# Patient Record
Sex: FEMALE | Race: WHITE | NOT HISPANIC OR LATINO | Employment: FULL TIME | URBAN - METROPOLITAN AREA
[De-identification: names, ages, dates, MRNs, and addresses within clinical notes are randomized per-mention and may not be internally consistent; named-entity substitution may affect disease eponyms.]

---

## 2018-07-02 PROCEDURE — 93010 ELECTROCARDIOGRAM REPORT: CPT | Mod: S$GLB,,, | Performed by: INTERNAL MEDICINE

## 2018-07-02 PROCEDURE — 93005 ELECTROCARDIOGRAM TRACING: CPT | Mod: S$GLB,,, | Performed by: FAMILY MEDICINE

## 2018-07-03 ENCOUNTER — CLINICAL SUPPORT (OUTPATIENT)
Dept: CARDIOLOGY | Facility: CLINIC | Age: 43
End: 2018-07-03
Attending: INTERNAL MEDICINE
Payer: COMMERCIAL

## 2018-07-03 ENCOUNTER — OFFICE VISIT (OUTPATIENT)
Dept: CARDIOLOGY | Facility: CLINIC | Age: 43
End: 2018-07-03
Payer: COMMERCIAL

## 2018-07-03 ENCOUNTER — OFFICE VISIT (OUTPATIENT)
Dept: FAMILY MEDICINE | Facility: CLINIC | Age: 43
End: 2018-07-03
Payer: COMMERCIAL

## 2018-07-03 ENCOUNTER — LAB VISIT (OUTPATIENT)
Dept: LAB | Facility: HOSPITAL | Age: 43
End: 2018-07-03
Attending: FAMILY MEDICINE
Payer: COMMERCIAL

## 2018-07-03 VITALS
WEIGHT: 192.88 LBS | BODY MASS INDEX: 34.18 KG/M2 | DIASTOLIC BLOOD PRESSURE: 78 MMHG | SYSTOLIC BLOOD PRESSURE: 116 MMHG | HEIGHT: 63 IN | HEART RATE: 88 BPM

## 2018-07-03 VITALS
RESPIRATION RATE: 17 BRPM | OXYGEN SATURATION: 98 % | SYSTOLIC BLOOD PRESSURE: 120 MMHG | HEART RATE: 97 BPM | HEIGHT: 63 IN | WEIGHT: 192.69 LBS | TEMPERATURE: 98 F | DIASTOLIC BLOOD PRESSURE: 80 MMHG | BODY MASS INDEX: 34.14 KG/M2

## 2018-07-03 DIAGNOSIS — R94.31 ABNORMAL EKG: ICD-10-CM

## 2018-07-03 DIAGNOSIS — R53.83 FATIGUE, UNSPECIFIED TYPE: ICD-10-CM

## 2018-07-03 DIAGNOSIS — R53.83 FATIGUE, UNSPECIFIED TYPE: Primary | ICD-10-CM

## 2018-07-03 DIAGNOSIS — G93.2 PSEUDOTUMOR CEREBRI: ICD-10-CM

## 2018-07-03 DIAGNOSIS — R94.31 ABNORMAL EKG: Primary | ICD-10-CM

## 2018-07-03 DIAGNOSIS — R60.0 EDEMA OF EXTREMITIES: ICD-10-CM

## 2018-07-03 DIAGNOSIS — M79.10 MYALGIA: ICD-10-CM

## 2018-07-03 DIAGNOSIS — R07.89 CHEST PRESSURE: ICD-10-CM

## 2018-07-03 DIAGNOSIS — M25.50 ARTHRALGIA, UNSPECIFIED JOINT: ICD-10-CM

## 2018-07-03 DIAGNOSIS — F41.9 ANXIETY: ICD-10-CM

## 2018-07-03 DIAGNOSIS — E66.9 OBESITY (BMI 30-39.9): ICD-10-CM

## 2018-07-03 LAB
ALBUMIN SERPL BCP-MCNC: 4.5 G/DL
ALP SERPL-CCNC: 92 U/L
ALT SERPL W/O P-5'-P-CCNC: 17 U/L
ANION GAP SERPL CALC-SCNC: 11 MMOL/L
AST SERPL-CCNC: 14 U/L
BASOPHILS # BLD AUTO: 0.01 K/UL
BASOPHILS NFR BLD: 0.2 %
BILIRUB SERPL-MCNC: 0.3 MG/DL
BUN SERPL-MCNC: 17 MG/DL
CALCIUM SERPL-MCNC: 9.5 MG/DL
CCP AB SER IA-ACNC: <0.5 U/ML
CHLORIDE SERPL-SCNC: 108 MMOL/L
CO2 SERPL-SCNC: 20 MMOL/L
CREAT SERPL-MCNC: 1 MG/DL
CRP SERPL-MCNC: 2.8 MG/L
DIASTOLIC DYSFUNCTION: NO
DIFFERENTIAL METHOD: ABNORMAL
EOSINOPHIL # BLD AUTO: 0 K/UL
EOSINOPHIL NFR BLD: 0.5 %
ERYTHROCYTE [DISTWIDTH] IN BLOOD BY AUTOMATED COUNT: 12.6 %
ERYTHROCYTE [SEDIMENTATION RATE] IN BLOOD BY WESTERGREN METHOD: 5 MM/HR
EST. GFR  (AFRICAN AMERICAN): >60 ML/MIN/1.73 M^2
EST. GFR  (NON AFRICAN AMERICAN): >60 ML/MIN/1.73 M^2
ESTIMATED PA SYSTOLIC PRESSURE: 23.25
GLUCOSE SERPL-MCNC: 74 MG/DL
HCT VFR BLD AUTO: 45.4 %
HGB BLD-MCNC: 14.5 G/DL
IMM GRANULOCYTES # BLD AUTO: 0.01 K/UL
IMM GRANULOCYTES NFR BLD AUTO: 0.2 %
LYMPHOCYTES # BLD AUTO: 1 K/UL
LYMPHOCYTES NFR BLD: 16.1 %
MCH RBC QN AUTO: 29.6 PG
MCHC RBC AUTO-ENTMCNC: 31.9 G/DL
MCV RBC AUTO: 93 FL
MONOCYTES # BLD AUTO: 0.4 K/UL
MONOCYTES NFR BLD: 6.5 %
NEUTROPHILS # BLD AUTO: 4.6 K/UL
NEUTROPHILS NFR BLD: 76.5 %
NRBC BLD-RTO: 0 /100 WBC
PLATELET # BLD AUTO: 283 K/UL
PMV BLD AUTO: 10.8 FL
POTASSIUM SERPL-SCNC: 3.6 MMOL/L
PROT SERPL-MCNC: 7.6 G/DL
RBC # BLD AUTO: 4.9 M/UL
RETIRED EF AND QEF - SEE NOTES: 55 (ref 55–65)
RHEUMATOID FACT SERPL-ACNC: <10 IU/ML
SODIUM SERPL-SCNC: 139 MMOL/L
WBC # BLD AUTO: 6.01 K/UL

## 2018-07-03 PROCEDURE — 99999 PR PBB SHADOW E&M-NEW PATIENT-LVL IV: CPT | Mod: PBBFAC,,, | Performed by: FAMILY MEDICINE

## 2018-07-03 PROCEDURE — 85651 RBC SED RATE NONAUTOMATED: CPT | Mod: PO

## 2018-07-03 PROCEDURE — 99204 OFFICE O/P NEW MOD 45 MIN: CPT | Mod: S$GLB,,, | Performed by: INTERNAL MEDICINE

## 2018-07-03 PROCEDURE — 86200 CCP ANTIBODY: CPT

## 2018-07-03 PROCEDURE — 3008F BODY MASS INDEX DOCD: CPT | Mod: CPTII,S$GLB,, | Performed by: INTERNAL MEDICINE

## 2018-07-03 PROCEDURE — 80053 COMPREHEN METABOLIC PANEL: CPT

## 2018-07-03 PROCEDURE — 85025 COMPLETE CBC W/AUTO DIFF WBC: CPT

## 2018-07-03 PROCEDURE — 3008F BODY MASS INDEX DOCD: CPT | Mod: CPTII,S$GLB,, | Performed by: FAMILY MEDICINE

## 2018-07-03 PROCEDURE — 86038 ANTINUCLEAR ANTIBODIES: CPT

## 2018-07-03 PROCEDURE — 93306 TTE W/DOPPLER COMPLETE: CPT | Mod: S$GLB,,, | Performed by: INTERNAL MEDICINE

## 2018-07-03 PROCEDURE — 99204 OFFICE O/P NEW MOD 45 MIN: CPT | Mod: S$GLB,,, | Performed by: FAMILY MEDICINE

## 2018-07-03 PROCEDURE — 86140 C-REACTIVE PROTEIN: CPT

## 2018-07-03 PROCEDURE — 99999 PR PBB SHADOW E&M-EST. PATIENT-LVL II: CPT | Mod: PBBFAC,,, | Performed by: INTERNAL MEDICINE

## 2018-07-03 PROCEDURE — 86431 RHEUMATOID FACTOR QUANT: CPT

## 2018-07-03 PROCEDURE — 36415 COLL VENOUS BLD VENIPUNCTURE: CPT | Mod: PO

## 2018-07-03 RX ORDER — SPIRONOLACTONE 25 MG/1
50 TABLET ORAL DAILY
COMMUNITY
Start: 2018-05-14 | End: 2020-10-14 | Stop reason: DRUGHIGH

## 2018-07-03 RX ORDER — ATORVASTATIN CALCIUM 20 MG/1
20 TABLET, FILM COATED ORAL DAILY
COMMUNITY

## 2018-07-03 RX ORDER — CITALOPRAM 20 MG/1
20 TABLET, FILM COATED ORAL DAILY
COMMUNITY

## 2018-07-03 RX ORDER — ACETAZOLAMIDE 500 MG/1
500 CAPSULE, EXTENDED RELEASE ORAL 2 TIMES DAILY
COMMUNITY
Start: 2018-05-11 | End: 2020-07-30

## 2018-07-03 RX ORDER — CLONAZEPAM 0.5 MG/1
0.5 TABLET ORAL DAILY
COMMUNITY

## 2018-07-03 RX ORDER — NAPROXEN 500 MG/1
500 TABLET ORAL 2 TIMES DAILY PRN
COMMUNITY
Start: 2018-06-08 | End: 2020-07-30

## 2018-07-03 RX ORDER — FUROSEMIDE 20 MG/1
40 TABLET ORAL DAILY
COMMUNITY
Start: 2018-05-11

## 2018-07-03 NOTE — PROGRESS NOTES
Subjective:   Patient ID:  Felicity HANNA is a 43 y.o. female who presents for evaluation of No chief complaint on file.      HPI  A 44 yo female with  Pseudotumor cerebri anxiety is here for evaluation of abnormal ekg referred from DR TIMI MENA the patient is on multiple diuretics she feels some chest pressure that is  Constant for the past 2 weeks. Her last potassium on 5/15 /2018 was 3.9. She ahs no complaints of shortness of breath. The chest pressure is not affected by stress exercise body position food intake sleep . Has no associated symptoms  Does not travel anywhere. She is uncomfortable with it. She feels it is fluid related. She thinks the pressure is similar top the pain she has in her joints. No difficulty taking a deep breath. No palpitation no syncope near syncope no recent viral illness. No recent respiratory illness.her ekg has non specific changes.    Past Medical History:   Diagnosis Date    Abnormal EKG 7/3/2018    Anxiety     Chest pressure 7/3/2018    Pseudotumor cerebri     Pseudotumor cerebri 7/3/2018       Past Surgical History:   Procedure Laterality Date    APPENDECTOMY      BREAST BIOPSY      HYSTERECTOMY         Social History   Substance Use Topics    Smoking status: Never Smoker    Smokeless tobacco: Never Used    Alcohol use No       Family History   Problem Relation Age of Onset    Cancer Mother     Hypertension Sister     Hypertension Brother     Heart attack Neg Hx     Heart disease Neg Hx     Stroke Neg Hx        Current Outpatient Prescriptions   Medication Sig    acetaZOLAMIDE (DIAMOX) 500 mg CpSR Take 500 mg by mouth 2 (two) times daily.    atorvastatin (LIPITOR) 20 MG tablet Take 20 mg by mouth Daily.    citalopram (CELEXA) 20 MG tablet Take 20 mg by mouth Daily.    clonazePAM (KLONOPIN) 0.5 MG tablet Take 0.5 mg by mouth 3 (three) times daily.    furosemide (LASIX) 20 MG tablet Take 20 mg by mouth Daily.    naproxen (NAPROSYN) 500 MG tablet Take  500 mg by mouth 2 (two) times daily as needed.    omega-3s/dha/epa/fish oil/D3 (VITAMIN-D + OMEGA-3 ORAL) Take by mouth.    spironolactone (ALDACTONE) 25 MG tablet Take 25 mg by mouth Daily.     No current facility-administered medications for this visit.      Current Outpatient Prescriptions on File Prior to Visit   Medication Sig    acetaZOLAMIDE (DIAMOX) 500 mg CpSR Take 500 mg by mouth 2 (two) times daily.    atorvastatin (LIPITOR) 20 MG tablet Take 20 mg by mouth Daily.    citalopram (CELEXA) 20 MG tablet Take 20 mg by mouth Daily.    clonazePAM (KLONOPIN) 0.5 MG tablet Take 0.5 mg by mouth 3 (three) times daily.    furosemide (LASIX) 20 MG tablet Take 20 mg by mouth Daily.    naproxen (NAPROSYN) 500 MG tablet Take 500 mg by mouth 2 (two) times daily as needed.    omega-3s/dha/epa/fish oil/D3 (VITAMIN-D + OMEGA-3 ORAL) Take by mouth.    spironolactone (ALDACTONE) 25 MG tablet Take 25 mg by mouth Daily.     No current facility-administered medications on file prior to visit.        Review of patient's allergies indicates:  No Known Allergies    Review of Systems   Constitution: Negative for diaphoresis, weakness, malaise/fatigue and weight gain.   HENT: Negative for hoarse voice.    Eyes: Negative for double vision and visual disturbance.   Cardiovascular: Negative for chest pain, claudication, cyanosis, dyspnea on exertion, irregular heartbeat, leg swelling, near-syncope, orthopnea, palpitations, paroxysmal nocturnal dyspnea and syncope.        Chest pressure    Respiratory: Negative for cough, hemoptysis, shortness of breath and snoring.    Hematologic/Lymphatic: Negative for bleeding problem. Does not bruise/bleed easily.   Skin: Negative for color change and poor wound healing.   Musculoskeletal: Positive for joint pain. Negative for muscle cramps, muscle weakness and myalgias.   Gastrointestinal: Negative for bloating, abdominal pain, change in bowel habit, diarrhea, heartburn, hematemesis,  "hematochezia, melena and nausea.   Neurological: Negative for excessive daytime sleepiness, dizziness, headaches, light-headedness, loss of balance and numbness.   Psychiatric/Behavioral: Negative for memory loss. The patient does not have insomnia.    Allergic/Immunologic: Negative for hives.       Objective:   Physical Exam   Constitutional: She is oriented to person, place, and time. She appears well-developed and well-nourished. She does not appear ill. No distress.   HENT:   Head: Normocephalic and atraumatic.   Eyes: EOM are normal. Pupils are equal, round, and reactive to light. No scleral icterus.   Neck: Normal range of motion. Neck supple. Normal carotid pulses, no hepatojugular reflux and no JVD present. Carotid bruit is not present. No tracheal deviation present. No thyromegaly present.   Cardiovascular: Normal rate, regular rhythm, normal heart sounds and normal pulses.  Exam reveals no gallop and no friction rub.    No murmur heard.  Pulmonary/Chest: Effort normal and breath sounds normal. No respiratory distress. She has no wheezes. She has no rhonchi. She has no rales. She exhibits no tenderness.   Abdominal: Soft. Normal appearance, normal aorta and bowel sounds are normal. She exhibits no abdominal bruit, no ascites and no pulsatile midline mass. There is no hepatomegaly. There is no tenderness.   Musculoskeletal: She exhibits no edema.        Right shoulder: She exhibits no deformity.   Neurological: She is alert and oriented to person, place, and time. She has normal strength. No cranial nerve deficit. Coordination normal.   Skin: Skin is warm and dry. No rash noted. No cyanosis or erythema. Nails show no clubbing.   Psychiatric: She has a normal mood and affect. Her speech is normal and behavior is normal.     Vitals:    07/03/18 1143   BP: 116/78   Pulse: 88   Weight: 87.5 kg (192 lb 14.4 oz)   Height: 5' 3" (1.6 m)     No results found for: CHOL  No results found for: HDL  No results found " for: LDLCALC  No results found for: TRIG  No results found for: CHOLHDL    Chemistry    No results found for: NA, K, CL, CO2, BUN, CREATININE, GLU No results found for: CALCIUM, ALKPHOS, AST, ALT, BILITOT, ESTGFRAFRICA, EGFRNONAA       No results found for: TSH  No results found for: INR, PROTIME  No results found for: WBC, HGB, HCT, MCV, PLT  BNP  @LABRCNTIP(BNP,BNPTRIAGEBLO)@  CrCl cannot be calculated (No order found.).  Assessment:     1. Abnormal EKG    2. Pseudotumor cerebri    3. Chest pressure      Atypical symptoms with generalized mild tenderness in joints w/o any chest wall tenderness or ischemic changes. She ahs non specific ekg changes that could be related to diuretics intake. Will obtain echo make sure no pericardial effusion   Plan:   Echo   Phone review.

## 2018-07-03 NOTE — LETTER
July 3, 2018      Lucia Espinoza MD  [address] [address] [address]           [facility] - Cardiology  [address] [facility]  [place] [address]  Phone: [phone]  Fax: [phone]          Patient: Felicity HANNA   MR Number: [id number]   YOB: 1975   Date of Visit: 7/3/2018       Dear Dr. Lucia Espinoza:    Thank you for referring Felicity HANNA to me for evaluation. Attached you will find relevant portions of my assessment and plan of care.    If you have questions, please do not hesitate to call me. I look forward to following Felicity HANNA along with you.    Sincerely,    Sanam Alvarado MD    Enclosure  CC:  No Recipients    If you would like to receive this communication electronically, please contact [email or web address] or [phone] to request more information on [employer] Link access.    For providers and/or their staff who would like to refer a patient to [facility], please contact us through our one-stop-shop provider referral line, [facility], at [phone].    If you feel you have received this communication in error or would no longer like to receive these types of communications, please e-mail [email or web address]

## 2018-07-03 NOTE — PROGRESS NOTES
"Subjective:       Patient ID: Feliciyt HANNA is a 43 y.o. female.    Chief Complaint: Fatigue    Fatigue   This is a new problem. Episode onset: 3 weeks. The problem occurs constantly. The problem has been unchanged. Associated symptoms include arthralgias, fatigue, headaches, myalgias and a visual change. Pertinent negatives include no abdominal pain, change in bowel habit, chest pain, congestion, coughing, nausea, rash, urinary symptoms, vertigo or weakness. Nothing aggravates the symptoms. Treatments tried: on Spironolactone, Diamox and Lasix. The treatment provided no relief.   Patient reports she is followed by Neurology and NSG after diagnosis of pseudotumor cerebri in February 2018. States she had an eye exam at [facility] and was sent to Ophthalmology (Dr. Mosqueda). She was then referred to Neurology (Dr. Medina) who ordered MRI and then referred on to NSG (Dr. Stone). She reports that she has also been evaluated by ENT due to pulsatile tinnitus (Dr. Pineda) with no significant findings per patient on exam. She reports persistent issues with edema. States she awakens daily with pain and swelling to the lower extremities- unable to ambulate initially. She states she feels is swollen "around the heart". She has never seen Cardiology. There is no report of CP. She describes pressure sensation along the left chest when she is "moving a lot at work". Denies any SOB or SETHI. She is taking Celexa and Klonopin (BID) for anxiety relief- stable symptom control is reported.     Past Medical History:   Diagnosis Date    Anxiety     Pseudotumor cerebri      Past Surgical History:   Procedure Laterality Date    APPENDECTOMY      BREAST BIOPSY      HYSTERECTOMY       Family History   Problem Relation Age of Onset    Cancer Mother      Review of Systems   Constitutional: Positive for fatigue. Negative for activity change, appetite change and unexpected weight change.   HENT: Positive for tinnitus. Negative " "for congestion, ear pain and sinus pressure.    Respiratory: Negative for cough and shortness of breath.    Cardiovascular: Positive for leg swelling. Negative for chest pain and palpitations.   Gastrointestinal: Negative for abdominal pain, change in bowel habit, constipation, diarrhea and nausea.   Endocrine: Negative for polydipsia and polyuria.   Genitourinary: Positive for decreased urine volume. Negative for difficulty urinating and frequency.   Musculoskeletal: Positive for arthralgias and myalgias.   Skin: Negative for color change and rash.   Neurological: Positive for headaches. Negative for dizziness, vertigo and weakness.   Psychiatric/Behavioral: Negative for dysphoric mood and sleep disturbance. The patient is nervous/anxious.        Objective:   /80   Pulse 97   Temp 97.5 °F (36.4 °C) (Temporal)   Resp 17   Ht 5' 3" (1.6 m)   Wt 87.4 kg (192 lb 10.9 oz)   SpO2 98%   BMI 34.13 kg/m²   Physical Exam   Constitutional: She is oriented to person, place, and time. She appears well-developed and well-nourished. No distress.   Obese, non-toxic   HENT:   Head: Normocephalic and atraumatic.   Right Ear: Tympanic membrane, external ear and ear canal normal.   Left Ear: Tympanic membrane, external ear and ear canal normal.   Nose: Nose normal.   Mouth/Throat: Oropharynx is clear and moist.   Eyes: Conjunctivae and EOM are normal. Pupils are equal, round, and reactive to light.   Neck: Normal range of motion. Neck supple. No thyromegaly present.   Cardiovascular: Normal rate, regular rhythm and normal heart sounds.    Pulmonary/Chest: Effort normal and breath sounds normal.   Abdominal: Soft. Bowel sounds are normal. She exhibits no distension. There is no tenderness.   Musculoskeletal: She exhibits no edema.   Neurological: She is alert and oriented to person, place, and time.   Skin: Skin is warm and dry. She is not diaphoretic.   Psychiatric: She has a normal mood and affect. Her behavior is " normal.       Assessment:       1. Fatigue, unspecified type    2. Arthralgia, unspecified joint    3. Myalgia    4. Edema of extremities    5. Pseudotumor cerebri    6. Abnormal EKG    7. Anxiety    8. Obesity (BMI 30-39.9)        Plan:      Fatigue, unspecified type  Reviewed notes brought by the patient today from [facility]. Recommend consideration for a sleep study to evaluate for possible PETER. States this has already been advised by her Neurologist. She has undergone workup to include assessment of cortisol, ACTH, thyroid function prolactin and growth hormone. She may wish to consider consultation with Endocrinology- she will see the PA for Dr. Medina next week and I have encouraged this conversation. She is very concerned about the diagnosis of empty sella syndrome and has not appreciated benefit from her current treatment plan. Discussed that DDX for fatigue is broad and likely multifactorial- she acknowledges understanding.   -     CBC auto differential; Future; Expected date: 07/03/2018  -     Comprehensive metabolic panel; Future; Expected date: 07/03/2018    Arthralgia, unspecified joint  No previous workup for her joint and muscle related pains. Proceed with labs as below. Patient may need further evaluation per Rheumatology pending results.  -     Sedimentation rate; Future; Expected date: 07/03/2018  -     C-reactive protein; Future; Expected date: 07/03/2018  -     DELORES; Future; Expected date: 07/03/2018  -     Rheumatoid factor; Future; Expected date: 07/03/2018  -     CYCLIC CITRUL PEPTIDE ANTIBODY, IGG; Future; Expected date: 07/03/2018    Myalgia  As above.  -     Sedimentation rate; Future; Expected date: 07/03/2018  -     C-reactive protein; Future; Expected date: 07/03/2018  -     DELORES; Future; Expected date: 07/03/2018  -     Rheumatoid factor; Future; Expected date: 07/03/2018  -     CYCLIC CITRUL PEPTIDE ANTIBODY, IGG; Future; Expected date: 07/03/2018    Edema of extremities  -      IN OFFICE EKG 12-LEAD (to Muse)    Pseudotumor cerebri  Continue with current treatment and follow-up recommendations as per Neurology.    Abnormal EKG  See Cardiology for further evaluation.    Anxiety  Continue with current treatment and follow-up recommendations as per PCP Dr. Randal Glover.    Obesity (BMI 30-39.9)  Weight loss efforts are encouraged.

## 2018-07-05 ENCOUNTER — TELEPHONE (OUTPATIENT)
Dept: CARDIOLOGY | Facility: CLINIC | Age: 43
End: 2018-07-05

## 2018-07-05 LAB — ANA SER QL IF: NORMAL

## 2018-07-05 NOTE — TELEPHONE ENCOUNTER
----- Message from Sanam Alvarado MD sent at 7/4/2018  9:37 AM CDT -----  Normal heart function no fluid around the heart

## 2018-07-05 NOTE — TELEPHONE ENCOUNTER
----- Message from Sanam Alvardao MD sent at 7/4/2018  9:37 AM CDT -----  Normal heart function no fluid around the heart

## 2018-08-23 ENCOUNTER — PATIENT MESSAGE (OUTPATIENT)
Dept: FAMILY MEDICINE | Facility: CLINIC | Age: 43
End: 2018-08-23

## 2019-12-19 ENCOUNTER — PATIENT MESSAGE (OUTPATIENT)
Dept: ADMINISTRATIVE | Facility: OTHER | Age: 44
End: 2019-12-19

## 2019-12-20 DIAGNOSIS — Z13.79 GENETIC SCREENING: ICD-10-CM

## 2020-07-28 ENCOUNTER — TELEPHONE (OUTPATIENT)
Dept: BARIATRICS | Facility: CLINIC | Age: 45
End: 2020-07-28

## 2020-07-28 NOTE — TELEPHONE ENCOUNTER
Phoned patient to discuss work up. Patient reports she had her sleep study done Monday and has another appointment on Thursday. Will request records. Patient reports she had a UTI last week and was treated with bactrim. Will route records to Dr. Rodriguez to review.

## 2020-07-29 ENCOUNTER — PATIENT MESSAGE (OUTPATIENT)
Dept: BARIATRICS | Facility: CLINIC | Age: 45
End: 2020-07-29

## 2020-07-29 DIAGNOSIS — Z01.818 PRE-OP EVALUATION: Primary | ICD-10-CM

## 2020-07-30 ENCOUNTER — OFFICE VISIT (OUTPATIENT)
Dept: ENDOCRINOLOGY | Facility: CLINIC | Age: 45
End: 2020-07-30
Payer: COMMERCIAL

## 2020-07-30 VITALS
WEIGHT: 211.44 LBS | BODY MASS INDEX: 37.46 KG/M2 | HEIGHT: 63 IN | DIASTOLIC BLOOD PRESSURE: 94 MMHG | SYSTOLIC BLOOD PRESSURE: 124 MMHG | HEART RATE: 99 BPM

## 2020-07-30 DIAGNOSIS — Z01.818 PRE-OP EVALUATION: ICD-10-CM

## 2020-07-30 DIAGNOSIS — G93.2 PSEUDOTUMOR CEREBRI: ICD-10-CM

## 2020-07-30 DIAGNOSIS — E23.6 EMPTY SELLA: Primary | ICD-10-CM

## 2020-07-30 PROCEDURE — 99204 PR OFFICE/OUTPT VISIT, NEW, LEVL IV, 45-59 MIN: ICD-10-PCS | Mod: S$GLB,,, | Performed by: INTERNAL MEDICINE

## 2020-07-30 PROCEDURE — 99999 PR PBB SHADOW E&M-EST. PATIENT-LVL IV: ICD-10-PCS | Mod: PBBFAC,,, | Performed by: INTERNAL MEDICINE

## 2020-07-30 PROCEDURE — 3008F PR BODY MASS INDEX (BMI) DOCUMENTED: ICD-10-PCS | Mod: CPTII,S$GLB,, | Performed by: INTERNAL MEDICINE

## 2020-07-30 PROCEDURE — 99999 PR PBB SHADOW E&M-EST. PATIENT-LVL IV: CPT | Mod: PBBFAC,,, | Performed by: INTERNAL MEDICINE

## 2020-07-30 PROCEDURE — 3008F BODY MASS INDEX DOCD: CPT | Mod: CPTII,S$GLB,, | Performed by: INTERNAL MEDICINE

## 2020-07-30 PROCEDURE — 99204 OFFICE O/P NEW MOD 45 MIN: CPT | Mod: S$GLB,,, | Performed by: INTERNAL MEDICINE

## 2020-07-30 RX ORDER — MULTIVITAMIN
1 TABLET ORAL DAILY
COMMUNITY
End: 2020-10-14 | Stop reason: CLARIF

## 2020-07-30 NOTE — LETTER
July 30, 2020      Hay Cheung Jr., MD  [address]  [facility] [address]           [facility] [address]  [address]  [facility] [address]  Phone: [phone]  Fax: [phone]          Patient: Felicity HANNA   MR Number: [id number]   YOB: 1975   Date of Visit: 7/30/2020       Dear Dr. Hay Cheung Jr.:    Thank you for referring Felicity HANNA to me for evaluation. Attached you will find relevant portions of my assessment and plan of care.    If you have questions, please do not hesitate to call me. I look forward to following Felicity HANNA along with you.    Sincerely,    Ermelinda Fernandes MD    Enclosure  CC:  No Recipients    If you would like to receive this communication electronically, please contact [email or web address] or [phone] to request more information on [employer] Link access.    For providers and/or their staff who would like to refer a patient to [facility], please contact us through our one-stop-shop provider referral line, [facility], at [phone].    If you feel you have received this communication in error or would no longer like to receive these types of communications, please e-mail [email or web address]

## 2020-07-30 NOTE — PROGRESS NOTES
Subjective:      Patient ID: Felicity HANNA is a 45 y.o. female.    Chief Complaint:  Pseudotumor Cerebri      History of Present Illness  She is a pharmacy tech    Here due concerns about imaging finding of an empty sella in 2018  I do not see the official report but it was noted in her documentation    She was diagnosis with pseudotumor cerebri in February 2018  States she had an eye exam at [facility] and was sent to Ophthalmology (Dr. Mosqueda)- papilledema. She was then referred to Neurology (Dr. Medina) who ordered MRI and then referred on to NSG (Dr. Stone)  Treatments tried: on Spironolactone, Diamox and Lasix. The treatment provided no relief.          Per care everywhere note an empty sella confirmed on MRI  This prompted labs:    in 2018 she had a normal ACTH   8:00 a.m. cortisol of 7.8   Prolactin was 13    To help address her ongoing concerns she is being evaluated for bariatric surgery and/ or  shunt   She is also undergoing an PETER evaluation - has an appt today for the results     Current concerns are:  Headaches  Worsening papilledema  +fatigue     Complains of inability to lose weight   No nausea vomiting or anorexia  Denies palpitations  Denies constipation or diarrhea     Partial hysterectomy   +hot all time  +vaginal dryness     No fractures       She does not have a diagnosis of hypothyroidism.... but she was on nature Thyroid 32.5 mg a day in the past - after seeing a doctor for low energy - she got pellets - early 40s   She is currently off all thyroid supplements    With regards to obesity, Body mass index is 37.45 kg/m².    Denies symptoms of hyperglycemia such as polyuria, polydipsia, nocturia, unexplained weight loss or blurred vision      She has been diagnosed with major depressive disorder as well as general anxiety    Review of Systems   Constitutional: Negative for unexpected weight change.   Eyes: Positive for visual disturbance.   Respiratory: Negative for shortness of breath.  "   Cardiovascular: Negative for chest pain.   Gastrointestinal: Negative for abdominal pain.   Musculoskeletal: Positive for back pain.   Skin: Negative for wound.   Neurological: Positive for headaches.   Hematological: Does not bruise/bleed easily.   Psychiatric/Behavioral: Positive for sleep disturbance.       Objective:     BP (!) 124/94 (BP Location: Left arm, Patient Position: Sitting)   Pulse 99   Ht 5' 3" (1.6 m)   Wt 95.9 kg (211 lb 6.7 oz)   BMI 37.45 kg/m²   BP Readings from Last 3 Encounters:   07/30/20 (!) 124/94   07/03/18 116/78   07/03/18 120/80     Wt Readings from Last 1 Encounters:   07/30/20 0911 95.9 kg (211 lb 6.7 oz)     Body mass index is 37.45 kg/m².      Physical Exam  Vitals signs reviewed.   Constitutional:       Appearance: She is well-developed.   HENT:      Right Ear: External ear normal.      Left Ear: External ear normal.      Nose: Nose normal.   Neck:      Thyroid: No thyromegaly.      Trachea: No tracheal deviation.   Cardiovascular:      Rate and Rhythm: Normal rate.      Heart sounds: No murmur.   Pulmonary:      Effort: Pulmonary effort is normal.      Breath sounds: Normal breath sounds.   Abdominal:      Palpations: Abdomen is soft.      Tenderness: There is no abdominal tenderness.      Hernia: No hernia is present.   Skin:     Findings: No rash.      Comments:        Neurological:      Cranial Nerves: No cranial nerve deficit.      Deep Tendon Reflexes: Reflexes normal.   Psychiatric:         Judgment: Judgment normal.       +  skin tags  No supraclavicular fulness  Pale thin striae  Normal proximal muscle strength        Lab Reviewed:  Care everywhere    Assessment and Plan     Empty sella  empty sella - usually just a radiology finding   generally not associated with anterior pituitary hormonal deficiency - check basic labs    Reassurance provided        Pseudotumor cerebri  Once we get results of hormonal studies no contraindications from an endocrine standpoint to " proceed with any procedures needed     Fatigue-- suspect due to sleep apnea.  She will get her results today  No signs or symptoms to suggest adrenal insufficiency

## 2020-07-30 NOTE — ASSESSMENT & PLAN NOTE
empty sella - usually just a radiology finding   generally not associated with anterior pituitary hormonal deficiency - check basic labs    Reassurance provided

## 2020-07-30 NOTE — ASSESSMENT & PLAN NOTE
Once we get results of hormonal studies no contraindications from an endocrine standpoint to proceed with any procedures needed

## 2020-07-31 ENCOUNTER — LAB VISIT (OUTPATIENT)
Dept: LAB | Facility: HOSPITAL | Age: 45
End: 2020-07-31
Attending: INTERNAL MEDICINE
Payer: COMMERCIAL

## 2020-07-31 DIAGNOSIS — E23.6 EMPTY SELLA: ICD-10-CM

## 2020-07-31 LAB
ALBUMIN SERPL BCP-MCNC: 4.3 G/DL (ref 3.5–5.2)
ALP SERPL-CCNC: 88 U/L (ref 55–135)
ALT SERPL W/O P-5'-P-CCNC: 24 U/L (ref 10–44)
ANION GAP SERPL CALC-SCNC: 10 MMOL/L (ref 8–16)
AST SERPL-CCNC: 19 U/L (ref 10–40)
BILIRUB SERPL-MCNC: 0.3 MG/DL (ref 0.1–1)
BUN SERPL-MCNC: 20 MG/DL (ref 6–20)
CALCIUM SERPL-MCNC: 9.5 MG/DL (ref 8.7–10.5)
CHLORIDE SERPL-SCNC: 106 MMOL/L (ref 95–110)
CO2 SERPL-SCNC: 25 MMOL/L (ref 23–29)
CORTIS SERPL-MCNC: 12 UG/DL (ref 4.3–22.4)
CREAT SERPL-MCNC: 1 MG/DL (ref 0.5–1.4)
EST. GFR  (AFRICAN AMERICAN): >60 ML/MIN/1.73 M^2
EST. GFR  (NON AFRICAN AMERICAN): >60 ML/MIN/1.73 M^2
ESTRADIOL SERPL-MCNC: 15 PG/ML
FSH SERPL-ACNC: 96.6 MIU/ML
GLUCOSE SERPL-MCNC: 98 MG/DL (ref 70–110)
POTASSIUM SERPL-SCNC: 3.9 MMOL/L (ref 3.5–5.1)
PROT SERPL-MCNC: 7.5 G/DL (ref 6–8.4)
SODIUM SERPL-SCNC: 141 MMOL/L (ref 136–145)

## 2020-07-31 PROCEDURE — 80053 COMPREHEN METABOLIC PANEL: CPT

## 2020-07-31 PROCEDURE — 82024 ASSAY OF ACTH: CPT

## 2020-07-31 PROCEDURE — 83001 ASSAY OF GONADOTROPIN (FSH): CPT

## 2020-07-31 PROCEDURE — 82533 TOTAL CORTISOL: CPT

## 2020-07-31 PROCEDURE — 84305 ASSAY OF SOMATOMEDIN: CPT

## 2020-07-31 PROCEDURE — 82670 ASSAY OF TOTAL ESTRADIOL: CPT

## 2020-08-03 ENCOUNTER — PATIENT MESSAGE (OUTPATIENT)
Dept: BARIATRICS | Facility: CLINIC | Age: 45
End: 2020-08-03

## 2020-08-03 ENCOUNTER — DOCUMENTATION ONLY (OUTPATIENT)
Dept: BARIATRICS | Facility: CLINIC | Age: 45
End: 2020-08-03

## 2020-08-03 DIAGNOSIS — E66.9 OBESITY, UNSPECIFIED CLASSIFICATION, UNSPECIFIED OBESITY TYPE, UNSPECIFIED WHETHER SERIOUS COMORBIDITY PRESENT: ICD-10-CM

## 2020-08-03 DIAGNOSIS — Z01.818 PRE-OP EVALUATION: ICD-10-CM

## 2020-08-03 NOTE — PROGRESS NOTES
Bariatric Surgery Online Course Form Submission  Someone has submitted a Bariatric Surgery Online Course Form Submission on this page.  Date: 07- 20:27:31  Patient's Name: Felicity Eisenberg  YOB: 1975 Email: [email or web address]  Phone: [phone]   Patient Address: [address] [place], [place] [address]  Preferred Surgical Location: [facility]   I certify that I am 18 years of age or older: Yes   Confirmation Code: [id number]  Verification of Bariatric Seminar: yes  Insurance Information  Insurance or Self Pay? Insurance - Fill out fields below  Insurance Company Name: Blue cross blue shield   Type of Coverage/Coverage Plan (i.e. PPO, HMO): PPO   Group Name: [employer]   Subscriber Name: Felicity Eisenberg   Member Name (patient's name): Felicity Eisenberg   Member ID/Policy #:[id number]   Plan Effective Date:   Card Issuance date:

## 2020-08-04 LAB — ACTH PLAS-MCNC: 12 PG/ML (ref 0–46)

## 2020-08-05 ENCOUNTER — LAB VISIT (OUTPATIENT)
Dept: LAB | Facility: HOSPITAL | Age: 45
End: 2020-08-05
Attending: SURGERY
Payer: COMMERCIAL

## 2020-08-05 DIAGNOSIS — Z01.818 PRE-OP EVALUATION: ICD-10-CM

## 2020-08-05 DIAGNOSIS — E66.9 OBESITY, UNSPECIFIED CLASSIFICATION, UNSPECIFIED OBESITY TYPE, UNSPECIFIED WHETHER SERIOUS COMORBIDITY PRESENT: ICD-10-CM

## 2020-08-05 LAB
IGF-I SERPL-MCNC: 138 NG/ML (ref 44–227)
IGF-I Z-SCORE SERPL: 0.67 SD

## 2020-08-05 PROCEDURE — 84425 ASSAY OF VITAMIN B-1: CPT

## 2020-08-05 PROCEDURE — 84100 ASSAY OF PHOSPHORUS: CPT

## 2020-08-05 PROCEDURE — 84439 ASSAY OF FREE THYROXINE: CPT

## 2020-08-05 PROCEDURE — 36415 COLL VENOUS BLD VENIPUNCTURE: CPT | Mod: PO

## 2020-08-05 PROCEDURE — 83735 ASSAY OF MAGNESIUM: CPT

## 2020-08-05 PROCEDURE — 82607 VITAMIN B-12: CPT

## 2020-08-05 PROCEDURE — 84480 ASSAY TRIIODOTHYRONINE (T3): CPT

## 2020-08-05 PROCEDURE — 84436 ASSAY OF TOTAL THYROXINE: CPT

## 2020-08-06 LAB
MAGNESIUM SERPL-MCNC: 2.3 MG/DL (ref 1.6–2.6)
PHOSPHATE SERPL-MCNC: 4.3 MG/DL (ref 2.7–4.5)
T3 SERPL-MCNC: 93 NG/DL (ref 60–180)
T4 FREE SERPL-MCNC: 1.01 NG/DL (ref 0.71–1.51)
T4 SERPL-MCNC: 7.3 UG/DL (ref 4.5–11.5)
VIT B12 SERPL-MCNC: 362 PG/ML (ref 210–950)

## 2020-08-10 DIAGNOSIS — Z01.818 PRE-OP EVALUATION: Primary | ICD-10-CM

## 2020-08-11 ENCOUNTER — PATIENT MESSAGE (OUTPATIENT)
Dept: BARIATRICS | Facility: CLINIC | Age: 45
End: 2020-08-11

## 2020-08-12 LAB — VIT B1 BLD-MCNC: 59 UG/L (ref 38–122)

## 2020-08-17 ENCOUNTER — PATIENT MESSAGE (OUTPATIENT)
Dept: BARIATRICS | Facility: CLINIC | Age: 45
End: 2020-08-17

## 2020-08-17 DIAGNOSIS — E66.9 OBESITY, UNSPECIFIED CLASSIFICATION, UNSPECIFIED OBESITY TYPE, UNSPECIFIED WHETHER SERIOUS COMORBIDITY PRESENT: ICD-10-CM

## 2020-08-17 DIAGNOSIS — Z01.818 PRE-OP EVALUATION: ICD-10-CM
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
## 2020-08-26 ENCOUNTER — INITIAL CONSULT (OUTPATIENT)
Dept: BARIATRICS | Facility: CLINIC | Age: 45
End: 2020-08-26
Payer: COMMERCIAL

## 2020-08-26 ENCOUNTER — CLINICAL SUPPORT (OUTPATIENT)
Dept: BARIATRICS | Facility: CLINIC | Age: 45
End: 2020-08-26
Payer: COMMERCIAL

## 2020-08-26 ENCOUNTER — OFFICE VISIT (OUTPATIENT)
Dept: PSYCHIATRY | Facility: CLINIC | Age: 45
End: 2020-08-26
Payer: COMMERCIAL

## 2020-08-26 ENCOUNTER — INITIAL CONSULT (OUTPATIENT)
Dept: INTERNAL MEDICINE | Facility: CLINIC | Age: 45
End: 2020-08-26
Payer: COMMERCIAL

## 2020-08-26 ENCOUNTER — LAB VISIT (OUTPATIENT)
Dept: LAB | Facility: HOSPITAL | Age: 45
End: 2020-08-26
Attending: SURGERY
Payer: COMMERCIAL

## 2020-08-26 VITALS — BODY MASS INDEX: 37.3 KG/M2 | HEIGHT: 63 IN | WEIGHT: 210.5 LBS

## 2020-08-26 VITALS
HEIGHT: 63 IN | WEIGHT: 210.56 LBS | DIASTOLIC BLOOD PRESSURE: 68 MMHG | SYSTOLIC BLOOD PRESSURE: 142 MMHG | HEART RATE: 79 BPM | BODY MASS INDEX: 37.31 KG/M2

## 2020-08-26 VITALS — HEIGHT: 63 IN | BODY MASS INDEX: 37.31 KG/M2 | WEIGHT: 210.56 LBS

## 2020-08-26 DIAGNOSIS — I10 ESSENTIAL HYPERTENSION: ICD-10-CM

## 2020-08-26 DIAGNOSIS — E66.01 CLASS 2 SEVERE OBESITY DUE TO EXCESS CALORIES WITH SERIOUS COMORBIDITY AND BODY MASS INDEX (BMI) OF 37.0 TO 37.9 IN ADULT: Primary | ICD-10-CM

## 2020-08-26 DIAGNOSIS — G93.2 PSEUDOTUMOR CEREBRI: ICD-10-CM

## 2020-08-26 DIAGNOSIS — Z01.818 PREOP EXAMINATION: Primary | ICD-10-CM

## 2020-08-26 DIAGNOSIS — Z01.818 PRE-OP EVALUATION: ICD-10-CM

## 2020-08-26 DIAGNOSIS — N20.0 KIDNEY STONES: ICD-10-CM

## 2020-08-26 DIAGNOSIS — R60.9 EDEMA, UNSPECIFIED TYPE: ICD-10-CM

## 2020-08-26 DIAGNOSIS — F41.9 ANXIETY: ICD-10-CM

## 2020-08-26 DIAGNOSIS — E78.5 HYPERLIPIDEMIA, UNSPECIFIED HYPERLIPIDEMIA TYPE: ICD-10-CM

## 2020-08-26 DIAGNOSIS — G47.33 OSA (OBSTRUCTIVE SLEEP APNEA): ICD-10-CM

## 2020-08-26 DIAGNOSIS — E23.6 EMPTY SELLA: ICD-10-CM

## 2020-08-26 DIAGNOSIS — E66.9 OBESITY, UNSPECIFIED CLASSIFICATION, UNSPECIFIED OBESITY TYPE, UNSPECIFIED WHETHER SERIOUS COMORBIDITY PRESENT: ICD-10-CM

## 2020-08-26 DIAGNOSIS — Z01.818 PREOPERATIVE EVALUATION TO RULE OUT SURGICAL CONTRAINDICATION: Primary | ICD-10-CM

## 2020-08-26 DIAGNOSIS — Z87.01 HISTORY OF PNEUMONIA: ICD-10-CM

## 2020-08-26 DIAGNOSIS — K21.9 GASTROESOPHAGEAL REFLUX DISEASE, ESOPHAGITIS PRESENCE NOT SPECIFIED: ICD-10-CM

## 2020-08-26 DIAGNOSIS — R94.31 ABNORMAL EKG: ICD-10-CM

## 2020-08-26 DIAGNOSIS — Z90.710 H/O VAGINAL HYSTERECTOMY: ICD-10-CM

## 2020-08-26 PROBLEM — R07.89 CHEST PRESSURE: Status: RESOLVED | Noted: 2018-07-03 | Resolved: 2020-08-26

## 2020-08-26 LAB
25(OH)D3+25(OH)D2 SERPL-MCNC: 29 NG/ML (ref 30–96)
ANION GAP SERPL CALC-SCNC: 7 MMOL/L (ref 8–16)
BASOPHILS # BLD AUTO: 0.02 K/UL (ref 0–0.2)
BASOPHILS NFR BLD: 0.4 % (ref 0–1.9)
BUN SERPL-MCNC: 15 MG/DL (ref 6–20)
CALCIUM SERPL-MCNC: 9.4 MG/DL (ref 8.7–10.5)
CHLORIDE SERPL-SCNC: 107 MMOL/L (ref 95–110)
CHOLEST SERPL-MCNC: 162 MG/DL (ref 120–199)
CHOLEST/HDLC SERPL: 3.6 {RATIO} (ref 2–5)
CO2 SERPL-SCNC: 28 MMOL/L (ref 23–29)
CREAT SERPL-MCNC: 0.9 MG/DL (ref 0.5–1.4)
DIFFERENTIAL METHOD: NORMAL
EOSINOPHIL # BLD AUTO: 0.1 K/UL (ref 0–0.5)
EOSINOPHIL NFR BLD: 1.6 % (ref 0–8)
ERYTHROCYTE [DISTWIDTH] IN BLOOD BY AUTOMATED COUNT: 12.2 % (ref 11.5–14.5)
EST. GFR  (AFRICAN AMERICAN): >60 ML/MIN/1.73 M^2
EST. GFR  (NON AFRICAN AMERICAN): >60 ML/MIN/1.73 M^2
ESTIMATED AVG GLUCOSE: 117 MG/DL (ref 68–131)
FOLATE SERPL-MCNC: 16.6 NG/ML (ref 4–24)
GLUCOSE SERPL-MCNC: 94 MG/DL (ref 70–110)
HBA1C MFR BLD HPLC: 5.7 % (ref 4–5.6)
HCT VFR BLD AUTO: 43.5 % (ref 37–48.5)
HDLC SERPL-MCNC: 45 MG/DL (ref 40–75)
HDLC SERPL: 27.8 % (ref 20–50)
HGB BLD-MCNC: 13.9 G/DL (ref 12–16)
IMM GRANULOCYTES # BLD AUTO: 0.01 K/UL (ref 0–0.04)
IMM GRANULOCYTES NFR BLD AUTO: 0.2 % (ref 0–0.5)
IRON SERPL-MCNC: 100 UG/DL (ref 30–160)
LDLC SERPL CALC-MCNC: 93.6 MG/DL (ref 63–159)
LYMPHOCYTES # BLD AUTO: 1.4 K/UL (ref 1–4.8)
LYMPHOCYTES NFR BLD: 28.9 % (ref 18–48)
MCH RBC QN AUTO: 28.8 PG (ref 27–31)
MCHC RBC AUTO-ENTMCNC: 32 G/DL (ref 32–36)
MCV RBC AUTO: 90 FL (ref 82–98)
MONOCYTES # BLD AUTO: 0.4 K/UL (ref 0.3–1)
MONOCYTES NFR BLD: 7.1 % (ref 4–15)
NEUTROPHILS # BLD AUTO: 3.1 K/UL (ref 1.8–7.7)
NEUTROPHILS NFR BLD: 61.8 % (ref 38–73)
NONHDLC SERPL-MCNC: 117 MG/DL
NRBC BLD-RTO: 0 /100 WBC
PLATELET # BLD AUTO: 293 K/UL (ref 150–350)
PMV BLD AUTO: 10.6 FL (ref 9.2–12.9)
POTASSIUM SERPL-SCNC: 4.1 MMOL/L (ref 3.5–5.1)
RBC # BLD AUTO: 4.82 M/UL (ref 4–5.4)
SATURATED IRON: 26 % (ref 20–50)
SODIUM SERPL-SCNC: 142 MMOL/L (ref 136–145)
TOTAL IRON BINDING CAPACITY: 388 UG/DL (ref 250–450)
TRANSFERRIN SERPL-MCNC: 262 MG/DL (ref 200–375)
TRIGL SERPL-MCNC: 117 MG/DL (ref 30–150)
TSH SERPL DL<=0.005 MIU/L-ACNC: 0.91 UIU/ML (ref 0.4–4)
WBC # BLD AUTO: 4.94 K/UL (ref 3.9–12.7)

## 2020-08-26 PROCEDURE — 86677 HELICOBACTER PYLORI ANTIBODY: CPT

## 2020-08-26 PROCEDURE — 3008F BODY MASS INDEX DOCD: CPT | Mod: CPTII,S$GLB,COE, | Performed by: SURGERY

## 2020-08-26 PROCEDURE — 96131 PSYCL TST EVAL PHYS/QHP EA: CPT | Mod: 95,COE,, | Performed by: PSYCHOLOGIST

## 2020-08-26 PROCEDURE — 80061 LIPID PANEL: CPT

## 2020-08-26 PROCEDURE — 96139 PR PSYCH/NEUROPSYCH TEST ADMIN/SCORING, BY TECH, 2+ TESTS, EA ADDTL 30 MIN: ICD-10-PCS | Mod: 95,COE,, | Performed by: PSYCHOLOGIST

## 2020-08-26 PROCEDURE — 36415 COLL VENOUS BLD VENIPUNCTURE: CPT | Mod: PO

## 2020-08-26 PROCEDURE — 96130 PR PSYCHOLOGIC TEST EVAL SVCS, 1ST HR: ICD-10-PCS | Mod: 95,COE,, | Performed by: PSYCHOLOGIST

## 2020-08-26 PROCEDURE — 96131 PR PSYCHOLOGIC TEST EVAL SVCS, EA ADDTL HR: ICD-10-PCS | Mod: 95,COE,, | Performed by: PSYCHOLOGIST

## 2020-08-26 PROCEDURE — 96138 PSYCL/NRPSYC TECH 1ST: CPT | Mod: 95,COE,, | Performed by: PSYCHOLOGIST

## 2020-08-26 PROCEDURE — 99204 OFFICE O/P NEW MOD 45 MIN: CPT | Mod: S$GLB,COE,, | Performed by: SURGERY

## 2020-08-26 PROCEDURE — 99999 PR PBB SHADOW E&M-EST. PATIENT-LVL III: CPT | Mod: PBBFAC,COE,, | Performed by: SURGERY

## 2020-08-26 PROCEDURE — 82746 ASSAY OF FOLIC ACID SERUM: CPT

## 2020-08-26 PROCEDURE — 99204 PR OFFICE/OUTPT VISIT, NEW, LEVL IV, 45-59 MIN: ICD-10-PCS | Mod: S$GLB,COE,, | Performed by: SURGERY

## 2020-08-26 PROCEDURE — 99499 NO LOS: ICD-10-PCS | Mod: S$GLB,COE,, | Performed by: DIETITIAN, REGISTERED

## 2020-08-26 PROCEDURE — 99999 PR PBB SHADOW E&M-EST. PATIENT-LVL III: ICD-10-PCS | Mod: PBBFAC,COE,, | Performed by: SURGERY

## 2020-08-26 PROCEDURE — 85025 COMPLETE CBC W/AUTO DIFF WBC: CPT

## 2020-08-26 PROCEDURE — 99244 PR OFFICE CONSULTATION,LEVEL IV: ICD-10-PCS | Mod: S$GLB,COE,, | Performed by: HOSPITALIST

## 2020-08-26 PROCEDURE — 80048 BASIC METABOLIC PNL TOTAL CA: CPT

## 2020-08-26 PROCEDURE — 83540 ASSAY OF IRON: CPT

## 2020-08-26 PROCEDURE — 3008F PR BODY MASS INDEX (BMI) DOCUMENTED: ICD-10-PCS | Mod: CPTII,S$GLB,COE, | Performed by: SURGERY

## 2020-08-26 PROCEDURE — 96130 PSYCL TST EVAL PHYS/QHP 1ST: CPT | Mod: 95,COE,, | Performed by: PSYCHOLOGIST

## 2020-08-26 PROCEDURE — 96139 PSYCL/NRPSYC TST TECH EA: CPT | Mod: 95,COE,, | Performed by: PSYCHOLOGIST

## 2020-08-26 PROCEDURE — 90791 PR PSYCHIATRIC DIAGNOSTIC EVALUATION: ICD-10-PCS | Mod: 95,COE,, | Performed by: PSYCHOLOGIST

## 2020-08-26 PROCEDURE — 99244 OFF/OP CNSLTJ NEW/EST MOD 40: CPT | Mod: S$GLB,COE,, | Performed by: HOSPITALIST

## 2020-08-26 PROCEDURE — 90791 PSYCH DIAGNOSTIC EVALUATION: CPT | Mod: 95,COE,, | Performed by: PSYCHOLOGIST

## 2020-08-26 PROCEDURE — 99499 UNLISTED E&M SERVICE: CPT | Mod: S$GLB,COE,, | Performed by: DIETITIAN, REGISTERED

## 2020-08-26 PROCEDURE — 96138 PR PSYCH/NEUROPSYCH TEST ADMIN/SCORING, BY TECH, 2+ TESTS, 1ST 30 MIN: ICD-10-PCS | Mod: 95,COE,, | Performed by: PSYCHOLOGIST

## 2020-08-26 PROCEDURE — 82306 VITAMIN D 25 HYDROXY: CPT

## 2020-08-26 PROCEDURE — 99999 PR PBB SHADOW E&M-EST. PATIENT-LVL III: CPT | Mod: PBBFAC,COE,, | Performed by: HOSPITALIST

## 2020-08-26 PROCEDURE — 84443 ASSAY THYROID STIM HORMONE: CPT

## 2020-08-26 PROCEDURE — 99999 PR PBB SHADOW E&M-EST. PATIENT-LVL III: ICD-10-PCS | Mod: PBBFAC,COE,, | Performed by: HOSPITALIST

## 2020-08-26 PROCEDURE — 99999 PR PBB SHADOW E&M-EST. PATIENT-LVL II: ICD-10-PCS | Mod: PBBFAC,COE,, | Performed by: DIETITIAN, REGISTERED

## 2020-08-26 PROCEDURE — 99999 PR PBB SHADOW E&M-EST. PATIENT-LVL II: CPT | Mod: PBBFAC,COE,, | Performed by: DIETITIAN, REGISTERED

## 2020-08-26 PROCEDURE — 83036 HEMOGLOBIN GLYCOSYLATED A1C: CPT

## 2020-08-26 RX ORDER — IBUPROFEN 200 MG
800 TABLET ORAL
COMMUNITY
End: 2020-10-14 | Stop reason: ALTCHOICE

## 2020-08-26 RX ORDER — SPIRONOLACTONE 50 MG/1
TABLET, FILM COATED ORAL
COMMUNITY
Start: 2020-08-24

## 2020-08-26 RX ORDER — CLONAZEPAM 0.5 MG/1
TABLET ORAL
Status: ON HOLD | COMMUNITY
Start: 2020-08-24 | End: 2020-10-16 | Stop reason: HOSPADM

## 2020-08-26 RX ORDER — MULTIVITAMIN
TABLET ORAL
COMMUNITY

## 2020-08-26 NOTE — OUTPATIENT SUBJECTIVE & OBJECTIVE
Outpatient Subjective & Objective     Chief complaint-Preoperative evaluation, Perioperative Medical management, complication reduction plan     Active cardiac conditions- none    Revised cardiac risk index predictors- none    Functional capacity -Examples of physical activity, house work, stays active , takes steps,  can take 1 flight of stairs----- She can undertake all the above activities without  chest pain,chest tightness, Shortness of breath ,dizziness,lightheadedness making her exercise tolerance more,   than 4 Mets.       Review of Systems   Constitutional: Negative for chills and fever.        No unusual weight changes       HENT:        STOPBANG score 3 / 8    Loud Snoring   Tiredness/ Napping during the day   Witnessed stopping of breathing   HTN  BMI> 35     Neck size over 40 CM     Eyes:        No new visual changes   Respiratory:        No cough , phlegm     No Hemoptysis   Cardiovascular:        As noted   Gastrointestinal:        No overt GI/ blood losses  Bowel movements- Regular- every other day - usual for her   Endocrine:        Prednisone use > 20 mg daily for 3 weeks- none    Genitourinary: Negative for dysuria.        No urinary hesitancy    Musculoskeletal:          No unusual, muscle, joint pains   Skin: Negative for rash.   Neurological: Negative for syncope.        No unilateral weakness   Hematological:        Current use of Anticoagulants  None   Ibuprofen 1-2 times a week   Psychiatric/Behavioral:          Depression, Anxiety - Controlled     No SI/HI     No vascular stenting     No past medical history pertinent negatives.        No anesthesia, bleeding, cardiac problems ,PONV with previous surgeries/procedures.  Medications and Allergies reviewed in epic.   Did not take a lot of time to recover from anesthesia after Appendectomy      FH- No anesthesia,bleeding / venous thrombosis , early onset heart disease in family     Physical Exam    Physical Exam  Constitutional- Vitals - Body  mass index is 37.29 kg/m²., There were no vitals filed for this visit.  General appearance-Conscious,Coherent  Eyes- No conjunctival icterus,pupils  round  and reactive to light   ENT-Oral cavity- moist  , Hearing grossly normal   Neck- No thyromegaly ,Trachea -central, No jugular venous distension,   No Carotid Bruit   Cardiovascular -Heart Sounds- Normal  and  no murmur   , No gallop rhythm   Respiratory - Normal Respiratory Effort, Normal breath sounds,  no wheeze  and  no forced expiratory wheeze    Peripheral pitting pedal edema-- mild, no calf pain   Gastrointestinal -Soft abdomen, No palpable masses, Non Tender,Liver,Spleen not palpable. No-- free fluid and shifting dullness  Musculoskeletal- No finger Clubbing. Strength grossly normal   Lymphatic-No Palpable cervical, axillary,Inguinal lymphadenopathy   Psychiatric - normal effect,Orientation  Rt Dorsalis pedis pulses-palpable    Lt Dorsalis pedis pulses- palpable   Rt Posterior tibial pulses -palpable   Left posterior tibial pulses -palpable   Miscellaneous -  no asterixis and  no renal bruit    Investigations  Lab and Imaging have been reviewed in epic.    Review of Medicine tests    EKG- I had independently reviewed the EKG from--7/2/2018   It was reported to be showing     Normal sinus rhythm   Nonspecific ST and T wave abnormality   Abnormal ECG   No previous ECGs available     2018     1 - Concentric remodeling.     2 - No wall motion abnormalities.     3 - Normal left ventricular systolic function (EF 55-60%).     4 - Normal left ventricular diastolic function.     5 - Normal right ventricular systolic function .     6 - The estimated PA systolic pressure is 23 mmHg.     8/12/2020     Normal sinus rhythm  Normal ECG  Compared to ECG 11/14/2019 07:04:20  Unchanged    Stress test - Nov 2019    1. Low risk treadmill stress echocardiogram with no ischemia and low risk   [facility] treadmill score of +6.  2. Hypertensive blood pressure response.  3. Average  exercise tolerance for age and gender.    Review of clinical lab tests:  Lab Results   Component Value Date    CREATININE 1.0 07/31/2020    HGB 14.5 07/03/2018     07/03/2018           Review of old records- Was done and information gathered regards to events leading to surgery and health conditions of significance in the perioperative period.    Outpatient Subjective & Objective

## 2020-08-26 NOTE — PSYCH TESTING
[facility]  [address] [place]  [address]  [phone]     REPORT OF PSYCHOLOGICAL TESTING     NAME: Felicity Eisenberg  OC #: [id number]  : 1975     REFERRED BY: Hay Cheung M.D.       EVALUATED BY:  Gina Ingram, Ph.D., Clinical Psychologist  FAN Sanchez, Psychometrician     DATES OF EVALUATION: 2020, 2020     EVALUATION PROCEDURES AND TIMES:  Conducted by Psychologist (2 hours):  Integration of patient data, interpretation of standardized test results and clinical data, clinical decision-making, treatment planning and report, and interactive feedback to the patient  CPT Codes:  28843 - 1 hour; 00130 - 1 hour  Conducted by Technician (2 hours, 15 minutes):  Psychological test administration and scoring by technician, two or more tests, any method:  [place] Multiphasic Personality Inventory - 2 - Restructured Form (MMPI-2-RF); [facility] Diagnostic (MBMD)  CPT Codes:  68289 - 30 minutes; 63910 - 30 minutes, 27251 - 30 minutes, 07825 - 30 minutes (3 additional units)     EVALUATION FINDINGS:  Before this evaluation was initiated, the purposes and process of the assessment and the limits of confidentiality were discussed with the patient who expressed understanding of these issues and orally consented to proceed with the evaluation.     Ms. Eisenberg has been overweight for many years, but reports that she has never considered her weight to be a problem. Factors that have contributed to her weight gain over the years include: eating ice cream late at night, eating fast food daily ([employer]), and eating a lot of starchy foods. In addition, Ms. Eisenberg acknowledges that she is an emotional eater, with stress/anxiety as her primary emotional trigger to overeat. A major trigger for her late night eating is when her  is in pain or having symptoms of heart problems. She describes staying up and fretting about his health and eating ice  cream to calm herself. She had a similar pattern of emotional eating after the death of her mother several years ago. She has been on diets in the past - one time losing 100 pounds about 20 years ago through some dietary changes, and also attempting to lose weight with Adipex unsuccessfully. She has not tried to lose any weight on her own recently because she has not felt she had problems with weight. Her motivation for seeking surgery now is her doctor's recommendation that she get this surgery to treat her pseudotumor cerebri. Her postsurgical goals include preserving her eyesight and decreasing headaches.      Ms. Eisenberg does report a diagnosis of Anxiety Disorder. She denies any current impairing symptoms of psychiatric illness. She remains on psychotropic medication for anxiety with good effect. She denies a history of eating disorder. She was aware of details regarding the sleeve procedure, as well as risks of the procedures and post-operative eating restrictions. She appears motivated for change at this time. She was fully cooperative and engaged in the assessment process.     Ms. Eisenberg produced a valid and interpretable MMPI-2RF protocol. Her test results should be considered a reasonably accurate reflection of his current psychiatric functioning. Testing indicates that she currently reports some mild anxiety symptoms (including intrusive ideation and tension) as well as cynical beliefs/thinking. She reports that both of these are atypical for her but related to her 's poor health and her feeling that the doctors are not doing all they can do for him. Her scores indicate no report of severe impairing psychiatric symptoms, personality dysfunction, or adjustment issues at this time.     The MBMD indicated that Ms. Eisenberg is reporting some depressive symptoms, including fatigue and helplessness. She is reluctant to disclose her emotions, as she does not want to feel burdensome to others, and tends to  "suppress problems and challenges in her life. She tends to be passive and submissive with others, letting others take charge which at times is at her detriment (particularly when it comes to self-care). However, once an illness has been identified, she is likely to want to be seen as a "good patient" and work with doctors to improve. Testing suggests that her recovery from surgery is unlikely to be impacted by psychological factors. Test results reveal that, compared to other bariatric surgery patients, there is a good chance that she will engage in healthy lifestyle changes to support good surgery results, and a high chance of improved quality of life and psychosocial functioning after surgery.      DIAGNOSTIC IMPRESSIONS:  Z68.37   Body Mass Index of 37  Z01.818 Pre-operative Exam  E66.9    Obesity  Anxiety NOS     SUMMARY AND RECOMMENDATIONS:  Ms. Eisenberg has a long history of weight problems and is pursuing bariatric surgery at this time in an effort to improve her health and quality of life. Results of personality testing should be considered valid, and they indicate that - while she is experiencing some mild depression and anxiety related to current stressors - she is experiencing no acute psychiatric symptoms or declines in functioning at this time and that there are no overt psychological contraindications are present at this time.  "

## 2020-08-26 NOTE — ASSESSMENT & PLAN NOTE
Over the counter antacids   I suggest aspiration precautions    Contributors    - Diet Coke- caffeine    - Eating late   - Weight
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
## 2020-08-26 NOTE — ASSESSMENT & PLAN NOTE
And depression - Family health , lost mother and her property  Celexa helping  I suggest monitoring the sodium as SIADH from Celexa  use and hypersecretion of ADH associated with surgery can reduce sodium in the perioperative period    Takes Clonazepam nightly for 3-4 years  - care suggested     Increased risk of post operative delirium

## 2020-08-26 NOTE — ASSESSMENT & PLAN NOTE
Edema- I suggested avoidance of added salt,avoidance of NSAID's, unless advised or ordered  and suggested Limb elevation and batool hose use    Contributors     Ibuprofen  On her feet at work

## 2020-08-26 NOTE — PROGRESS NOTES
"NUTRITIONAL CONSULT    Referring Physician: Hay Cheung M.D.  Reason for MNT Referral: Initial assessment for sleeve gastrectomy work-up    PAST MEDICAL HISTORY:   45 y.o. female  Body mass index is 37.78 kg/m²..  Weight history includes 250 lb 20 years ago. Lowest weight 140 lbs 21 years ago. Pt's typical weight ranges from 210-220.  Dieting attempts include: Phentermine, amino acid and water diet.    Past Medical History:   Diagnosis Date    Abnormal EKG 7/3/2018    Anxiety     Chest pressure 7/3/2018    Depression     GERD (gastroesophageal reflux disease)     History of renal stone     HTN (hypertension)     Hyperlipidemia     Pseudotumor cerebri     Pseudotumor cerebri 7/3/2018       CLINICAL DATA:  45 y.o.-year-old White female.  Height: 5'2.6"  Weight: 211 lbs  IBW: 135 lbs  BMI: 37.78  The patient's goal weight (50% EBW): 173 lbs  Personal goal weight: 150-160 lbs    Goal for Bariatric Surgery: pseudotumor cebebri resolution    DAILY NUTRITIONAL NEEDS: pre op bariatric nutritional guidelines to promote weight loss  7589-9907 Calories    Grams Protein    NUTRITION & HEALTH HISTORY:  Greatest challenge: starchy CHO    Current diet recall:   B ham sandwich 1/2  L subway special 6' inch sub chicken ter or meatballs and chocolate chip cookies  D 1/2 ham sandwich cheese and singh  Snack: ice cream on occ    Current Diet:  Meal pattern:   Protein supplements: strawberry smoothie from smoothie carmen  And IDLife-26 mgs protein powder with 4 gms of protein  Snackin-1 / day  Vegetables: Likes a variety. Eats once per week.  Fruits: Likes a variety. Eats 2-3 times per week.  Beverages: water and diet coke 5 per day  Dining out: Weekly.three Mostly fast food.  Cooking at home: Weekly. 2-3 times per week Mostly baked, grilled, smothered and fried meat and starchy CHO.    Exercise:  Past exercise: Fair    Current exercise: Fair  Walking at work pharm tech  Restrictions to exercise: none " reported    Vitamins / Minerals / Herbs:   Multivitamin tablet      Labs:    none available at this time    Food Allergies:   None reported    Social:  Works regular daytime shifts.730 to 5, 9 to 7,  11 to 9 prior to covid  Lives with   Grocery shopping and food prep   Patient believes the household will be supportive after surgery.  Alcohol: None.  Smoking: None.    ASSESSMENT:  · Patient reports attempts at weight loss, only to regain lost weight.  · Patient demonstrated knowledge of healthy eating behaviors and exercise patterns; admits to not eating healthy and not exercising at this point.  · Patient states willingness to change lifestyle and make behavior modifications as evidenced by weight loss and drinking only sugar-free beverages.        Barriers to Education: none    Stage of change: determination    NUTRITION DIAGNOSIS:     Morbid Obesity related to Excessive carbohydrate intake and Physical inactivity as evidence by BMI.    BARIATRIC DIET DISCUSSION/PLAN:  Discussed diet after surgery and related to patient's food record.  Reviewed nutrition guidelines for before and after surgery.  Answered all questions.  Work on Bariatric Nutrition Checklist.  Work on expanding variety of vegetables.  Work on gradually cutting back on starchy CHO in the diet.  5-6 meals per day.  Start including protein supplements in the diet plan daily.  Reduce frequency of dining out.  More grocery shopping and meal preparation at home.  Increase exercise.  Return to clinic.    RECOMMENDATIONS:  Patient is a potential candidate for bariatric surgery.    Needs additional visit(s) with RD. In 2 weeks virtual visit    Patient verbalized understanding.    Expect good  compliance after surgery at this time.    Communicated nutrition plan with bariatric team.    SESSION TIME:  60 minutes

## 2020-08-26 NOTE — LETTER
August 26, 2020      Hay Cheung Jr., MD  [address] [facility] [address]           [facility]  [address] [facility] [address]  Phone: [phone]          Patient: Felicity Eisenberg   MR Number: [id number]   YOB: 1975   Date of Visit: 8/26/2020       Dear Dr. Hay Cheung Jr.:    Thank you for referring Felicity Eisenberg to me for evaluation. Attached you will find relevant portions of my assessment and plan of care.    If you have questions, please do not hesitate to call me. I look forward to following Felicity Eisenberg along with you.    Sincerely,    Zamzam Rodriguez MD    Enclosure  CC:  Diana Ashley RN    If you would like to receive this communication electronically, please contact [email or web address] or [phone] to request more information on Ebook Gl[employer] Link access.    For providers and/or their staff who would like to refer a patient to [facility], please contact us through our one-stop-shop provider referral line, [facility], at [phone].    If you feel you have received this communication in error or would no longer like to receive these types of communications, please e-mail [email or web address]

## 2020-08-26 NOTE — PATIENT INSTRUCTIONS
Tips to Control Acid Reflux    To control acid reflux, youll need to make some basic diet and lifestyle changes. The simple steps outlined below may be all youll need to ease discomfort.  Watch what you eat  · Avoid fatty foods and spicy foods.  · Eat fewer acidic foods, such as citrus and tomato-based foods. These can increase symptoms.  · Limit drinking alcohol, caffeine, and fizzy beverages. All increase acid reflux.  · Try limiting chocolate, peppermint, and spearmint. These can worsen acid reflux in some people.  Watch when you eat  · Avoid lying down for 3 hours after eating.  · Do not snack before going to bed.  Raise your head  Raising your head and upper body by 4 to 6 inches helps limit reflux when youre lying down. Put blocks under the head of your bed frame to raise it.  Other changes  · Lose weight, if you need to  · Dont exercise near bedtime  · Avoid tight-fitting clothes  · Limit aspirin and ibuprofen  · Stop smoking   Date Last Reviewed: 7/1/2016  © 0323-8757 [employer]. [address] [place] [address], [place] [address]. All rights reserved. This information is not intended as a substitute for professional medical care. Always follow your healthcare professional's instructions.

## 2020-08-26 NOTE — ASSESSMENT & PLAN NOTE
Had endocrinology evaluation   July 2020   It was felt that    generally not associated with anterior pituitary hormonal deficiency     Had work up   ACTH, 8 AM cortisol - Normal   FSH Estradiol - N  IGF- N

## 2020-08-26 NOTE — HPI
History of present illness- I had the pleasure of meeting this pleasant 45 y.o. lady in the pre op clinic prior to her elective Abdominal surgery. The patient is new to me .    I have obtained the history by speaking to the patient and by reviewing the electronic health records.    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 He has health problems   Daughter lives close by and can help     Health conditions of significance for the perioperative period     - Obesity     - Anxiety     Not known to have heart disease , Diabetes Mellitus, Lung disease

## 2020-08-26 NOTE — ASSESSMENT & PLAN NOTE
On Spironolactone , Lasix   Home BP readings -120/80's    Hypertension-  Blood pressure is acceptable . I suggest holding  Spironolactone , Lasix  - on the morning of the surgery and can continue that  post operatively under blood pressure, electrolyte and renal function monitoring as long as they are acceptable.I suggest addressing pain control as uncontrolled pain can increased blood pressure
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
## 2020-08-26 NOTE — ASSESSMENT & PLAN NOTE
Around 2005   For endometriosis   Completed family   Still has one ovary   Has hot flashes   Not on HRT

## 2020-08-26 NOTE — PROGRESS NOTES
"Psychiatry Initial Visit (PhD/LCSW)   Diagnostic Interview - CPT 28488     Date: 08/26/2020    The patient location is: her house  The chief complaint leading to consultation is: presurgical psychological evaluation    Visit type: audiovisual    Face to Face time with patient: 60  120 minutes of total time spent on the encounter, which includes face to face time and non-face to face time preparing to see the patient (eg, review of tests), Obtaining and/or reviewing separately obtained history, Documenting clinical information in the electronic or other health record, Independently interpreting results (not separately reported) and communicating results to the patient/family/caregiver, or Care coordination (not separately reported).         Each patient to whom he or she provides medical services by telemedicine is:  (1) informed of the relationship between the physician and patient and the respective role of any other health care provider with respect to management of the patient; and (2) notified that he or she may decline to receive medical services by telemedicine and may withdraw from such care at any time.         Referral source: Hay Cheung Jr., M.D.    Clinical status of patient: Outpatient     Felicity Eisenberg, a 45 y.o. female, presented for initial evaluation visit. Before this evaluation was initiated, the purposes and process of the assessment and the limits of confidentiality were discussed with the patient who expressed understanding of these issues and orally consented to proceed with the evaluation.     Chief complaint/reason for encounter: Routine psychological evaluation prior to bariatric surgery.     Type of surgery sought: LRNY or Sleeve ("not sure yet").    History of present illness: Ms. Eisenberg is a 45-year-old  female who is pursuing bariatric surgery to improve her health and quality of life. She has a history of depressive episodes in the past, and more recently has " "struggled with symptoms of anxiety in relation to major psychosocial stressors. She takes Celexa and Klonopin with good effect. She has never been hospitalized for psychiatric reasons and denied any history of suicidality. She has not yet started making lifestyle changes. The patient has a Body Mass Index of 37 as documented by the referring provider.    Ms. Eisenberg has been overweight for many years, but reports that she has never considered her weight to be a problem. Factors that have contributed to her weight gain over the years include: eating ice cream late at night, eating fast food daily ([employer]), and eating a lot of starchy foods. In addition, Ms. Eisenberg acknowledges that she is an emotional eater, with stress/anxiety as her primary emotional trigger to overeat. A major trigger for her late night eating is when her  is in pain or having symptoms of heart problems. She describes staying up and fretting about his health and eating ice cream to calm herself. She had a similar pattern of emotional eating after the death of her mother several years ago. She has been on diets in the past - one time losing 100 pounds about 20 years ago through some dietary changes, and also attempting to lose weight with Adipex unsuccessfully. She has not tried to lose any weight on her own recently because she has not felt she had problems with weight. Her motivation for seeking surgery now is her doctor's recommendation that she get this surgery to treat her pseudotumor cerebri. Her postsurgical goals include preserving her eyesight and decreasing headaches.        Co-morbidities: Pseudotumor Cerebri, HLD, PETER    Knowledge of surgery information:  - Basics of procedure: Y - "cut out majority of stomach".  - Risks: Y - "dumping, bleeding, infections".  - Basics of diet: Y - "a lot of protein shakes, more liquids, get rid of bread, soda, ice cream".    Pain: 4/10 - headache    Psychiatric Symptoms:   Depression - Denied " "significant symptoms of depression.   Rylie/Hypomania - Denied significant symptoms of rylie/hypomania.  Anxiety - She does experience a lot of worry and stress when she's home but does not find that the worry interferes with her life. Within the past 5 years, her  has had several strokes and heart problems, then her mother got cancer and  after she took care of her for years. She is very worried because she sees something is wrong with her  but the doctors are saying "nothing is wrong", so there are times she worries on her way home from work that she will "walk in and find him dead on the floor". She denies problems with sleep, irritability, or concentration problems related to her anxiety.  Thoughts - denied delusions, hallucinations.  Suicidal thoughts/behaviors - denied.  Substance abuse - denied abuse or dependence.   Sleep - 8 hours per night - recently diagnosed with Sleep Apnea and will be getting CPAP.  Self-injury - denied.    Current psychiatric treatment:  Medications: Celexa, Klonopin PRN (takes one per day, not three times as prescribed).    Psychotherapy: None.    Treating clinicians: PCP    Health behaviors: Reported adherence to pharmacologic regimen.      Psychiatric history:   Previous diagnosis: Depression - she was diagnosed by PCP about 10 years ago; states she was "short-tempered" and crying a lot - her doctor suggested that she was depressed and placed her on medication. More recently, due to stressors mentioned above she was diagnosed with Anxiety by her PCP.    Previous hospitalizations: Denies.     History of outpatient treatment: Outside of PCP prescribed medications, she has not been in mental health treatment.    Previous suicide attempt: Denied.      Trauma history:  Denies.      History of eating disorders:  History of bulimia: Denies.    History of binge-eating episodes: Denies can. She can eat up to one pint of ice cream in one sitting late at night. There are no " "negative emotional effects attached to this however.      Family history of psychiatric illness: None reported.     Social history (marriage, employment, etc.): Ms. Eisenberg was born in [place] and raised in [facility] by her biological parents who were . Her mother  4 years ago and her father is still alive. She has 3 siblings. She describes her childhood as "a good life even though we were poor". She denies a history of abuse or trauma. Ms. Eisenberg graduated from high school. 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 She works full time. She has been  for 20 years. She has one daughter (age 30) and two stepchildren (30 and 28). She lives in [place] with her . She identifies as "full Gospel".      Current psychosocial stressors: 's poor health (heart problems - CAD), financial problems ( is unable to work but has not been able to get Disability).    Report of coping skills: She recently bought a [employer] (?) machine so that she could start making shirts for her grandchildren. She also ordered a stationary bike to help with exercise.    Support system:  has been supportive about her getting the surgery but "doesn't say much about it". She has told her daughters, mother, sisters and everyone has been supportive.    Substance use:   Alcohol: Denied current use; denied history of abuse or dependency.   Drugs: Denied current use; denied history of abuse or dependency.  Tobacco: None.   Caffeine: Drink 7 cans of Diet Coke per day.    Current medications and drug reactions (include OTC, herbal): see medication list     Strengths and liabilities: Strength: Patient accepts guidance/feedback, Strength: Patient is expressive/articulate., Strength: Patient is intelligent., Strength: Patient is motivated for change., Strength: Patient has positive support network., Strength: Patient has reasonable judgment., Strength: Patient is " "stable.     Current Evaluation:    Mental Status Exam:   General Appearance:  age appropriate, well dressed, neatly groomed, overweight    Speech:  normal tone, normal rate, normal pitch, normal volume    Level of Cooperation:  cooperative    Thought Processes:  normal and logical    Mood:  euthymic    Thought Content:  normal, no suicidality, no homicidality, delusions, or paranoia    Affect:  congruent and appropriate    Orientation:  oriented x3    Memory:  recent memory intact; immediate and delayed word recall 3/3  remote memory intact; able to recall remote personal events   Attention Span & Concentration:  spelled "WORLD" forwards and backwards without errors   Fund of General Knowledge:  appropriate for education    Abstract Reasoning:  interpretation of proverbs was abstract; interpretation of similarities was abstract   Judgment & Insight:  good    Language  intact        Diagnostic Impression - Plan:      Diagnostic Impression:  Z01.818 Pre-operative examination   E66.9    Obesity  G47.33   Obstructive Sleep Apnea  E78.5      Hyperlipidemia  Z68.37    Body Mass Index of 37  Anxiety NOS    Summary/Conclusion:   There are no overt psychological contraindications for proceeding with bariatric surgery. Ms. Bueno does experience a lot of worry related to her 's health condition as well as their financial situation; however, at this time her anxiety appears to be reasonable given the severity of the current stressors and also does not appear to be impacting her functioning. She also denies any other symptoms of an anxiety disorder. She reports stable mood and that her current medication regimen is helpful. She has reasonable expectations for surgery and good social support at this time. She has verbalized appropriate awareness and commitment to the necessary behavioral changes associated with bariatric surgery and appears willing to comply with long-term lifestyle changes.     Recommendations:  -This " patient is psychologically cleared to proceed with bariatric surgery.  -Her anxiety appears to be well-controlled with medication at this time, so no further treatment recommendations are being made at this time.  -We had a lengthy discussion about the importance of her working on her emotional eating patterns, and she was given some suggestions on how to start this, including developing new stress management strategies that she can redirect her attention to and ceasing the purchase of ice cream at home. She was encouraged not to rely on dumping syndrome only to help her with the emotional eating and to be more proactive with these measures. She agreed to work on this. She was informed that if she needs extra help and coaching, she can contact us for more support.    Please see Psychological Testing report available in Notes tab of Chart Review in Epic for results of psychological testing.

## 2020-08-26 NOTE — ASSESSMENT & PLAN NOTE
Tried Diamox - stopped due to kidney stones  Since stopping Diamox - no kidney stones    Stays hydrated

## 2020-08-26 NOTE — ASSESSMENT & PLAN NOTE
Had routine EKG      July 2018  Normal sinus rhythm   Nonspecific ST and T wave abnormality   Abnormal ECG   No previous ECGs available    Had Cardiology evaluation     July 2018      1 - Concentric remodeling.     2 - No wall motion abnormalities.     3 - Normal left ventricular systolic function (EF 55-60%).     4 - Normal left ventricular diastolic function.     5 - Normal right ventricular systolic function .     6 - The estimated PA systolic pressure is 23 mmHg.     Not known to have heart disease     Occasional chest tight ness once in month   Left upper chest  1 year   Notices this when she has to pick her  up   She wonders if this is musculoskeletal   No radiation   No related to [place] activity   Happens after activity   Non pleuritic  Has occasional acid reflux     Does nor cardiac   Sounds Musculo skeletal

## 2020-08-26 NOTE — ASSESSMENT & PLAN NOTE
Around  April 2019   Was not hospitalized   Settled with oral antibiotic and inhaler   Was tested Negative  For COVID

## 2020-08-26 NOTE — PROGRESS NOTES
BARIATRIC NEW PATIENT EVALUATION    CHIEF COMPLAINT:   Morbid obesity, body mass index is 37.78 kg/m². and inability to lose weight.    HPI:  Felicity Eisenberg is a 45 y.o. morbidly obese female. Her current body mass index is 37.78 kg/m². She has multiple associated comorbidities including PETER on CPAP and pseudotumor cerebri.  Her highest adult weight was 250, and her lowest adult weight was 210lbs.  The patient has tried phentermine (Adipex-P) and Amino Acid diet.  The patient was most successful with Amino Acid with a weight loss of 100lbs.  Her current exercise includes walking. She denies any history of eating disorder such as anorexia, bulimia, or taking laxatives for weight loss, and denies any addiction including illicit substances, alcohol, or gambling.  Patient states she has a good  support system.  She lives with her .  She is currently employed with [employer].  She  denies a history of GERD.      PAST MEDICAL HISTORY:  Past Medical History:   Diagnosis Date    Abnormal EKG 7/3/2018    Anxiety     Chest pressure 7/3/2018    Depression     GERD (gastroesophageal reflux disease)     History of renal stone     HTN (hypertension)     Hyperlipidemia     Pseudotumor cerebri     Pseudotumor cerebri 7/3/2018       PAST SURGICAL HISTORY:  Past Surgical History:   Procedure Laterality Date    APPENDECTOMY      2012    BREAST BIOPSY Left     HYSTERECTOMY      partial       FAMILY HISTORY:  Family History   Problem Relation Age of Onset    Cancer Mother         lung - was a tobacco smoker     Hypertension Sister     Hypertension Brother     Heart attack Neg Hx     Heart disease Neg Hx     Stroke Neg Hx         SOCIAL HISTORY:  Social History     Socioeconomic History    Marital status:      Spouse name: Not on file    Number of children: Not on file    Years of education: Not on file    Highest education level: Not on file   Occupational History    Not on file   Social Needs     "Financial resource strain: Not on file    Food insecurity     Worry: Not on file     Inability: Not on file    Transportation needs     Medical: Not on file     Non-medical: Not on file   Tobacco Use    Smoking status: Never Smoker    Smokeless tobacco: Never Used   Substance and Sexual Activity    Alcohol use: No    Drug use: No    Sexual activity: Yes     Partners: Male   Lifestyle    Physical activity     Days per week: Not on file     Minutes per session: Not on file    Stress: Not on file   Relationships    Social connections     Talks on phone: Not on file     Gets together: Not on file     Attends [employer] service: Not on file     Active member of club or organization: Not on file     Attends meetings of clubs or organizations: Not on file     Relationship status: Not on file   Other Topics Concern    Not on file   Social History Narrative    Not on file       MEDICATIONS:  Medications have been reviewed.    ALLERGIES:  Allergies have been reviewed.    Review of Systems   Constitutional: Negative for chills, diaphoresis, fever and weight loss.   HENT: Negative for congestion, ear discharge, ear pain, hearing loss, nosebleeds and tinnitus.    Eyes: Negative for blurred vision, pain, discharge and redness.   Respiratory: Negative for cough, sputum production, shortness of breath and wheezing.    Cardiovascular: Negative for chest pain, palpitations, claudication and leg swelling.   Gastrointestinal: Negative for abdominal pain, constipation, diarrhea, heartburn, nausea and vomiting.   Musculoskeletal: Negative for back pain, joint pain, myalgias and neck pain.   Skin: Negative for itching and rash.   Neurological: Negative for dizziness, tingling, tremors, weakness and headaches.   Psychiatric/Behavioral: Negative for depression, hallucinations, substance abuse and suicidal ideas.       Vitals:    08/26/20 1305   Weight: 95.5 kg (210 lb 8.6 oz)   Height: 5' 2.6" (1.59 m)   PainSc:   4   PainLoc: " Head       Physical Exam   Constitutional: She is oriented to person, place, and time and well-developed, well-nourished, and in no distress. No distress.   HENT:   Head: Normocephalic and atraumatic.   Right Ear: External ear normal.   Left Ear: External ear normal.   Eyes: Conjunctivae and EOM are normal. Right eye exhibits no discharge. Left eye exhibits no discharge. No scleral icterus.   Neck: Normal range of motion. Neck supple.   Cardiovascular: Normal rate and regular rhythm.   Pulmonary/Chest: Effort normal. No respiratory distress.   Abdominal: Soft. Bowel sounds are normal. She exhibits no distension and no mass. There is no abdominal tenderness. There is no rebound and no guarding.   Musculoskeletal: Normal range of motion.         General: No tenderness or edema.   Neurological: She is alert and oriented to person, place, and time.   Skin: Skin is warm and dry. No rash noted. She is not diaphoretic. No erythema. No pallor.   Psychiatric: Mood, memory, affect and judgment normal.       DIAGNOSIS:  1. Morbid obesity, body mass index is 37.78 kg/m². and inability to lose weight.  2. Co-morbidities: PETER on CPAP and pseudotumor cerebri    PLAN:  The patient is a good candidate for Bariatric Surgery. The patient is interested in laparoscopic sleeve gastrectomy. The surgery and post-op care was discussed in detail with the patient. All questions were answered.    The patient understands that bariatric surgery is a tool to aid in weight loss and that they need to be committed to the diet and exercise post-operatively for successful weight loss. Discussed with patient that bariatric surgery is not the easy way out and that it will take plenty of dedication on the patient's part to be successful. Also discussed the possibility of weight regain if the patient strays from the diet guidelines or exercise requirements. Patient verbalized understanding and wishes to proceed with the work-up.      ORDERS:  1. Await  quintonp study results  2. Dietician clearance    PCP: Julio Hanks MD  RTC: As scheduled.

## 2020-08-26 NOTE — PATIENT INSTRUCTIONS
Bariatric Diet Grocery List      High in Protein:   ? Canned tuna or chicken (packed in water)  ? Lean ground turkey breast or ground round  ? Turkey or chicken (no skin)  ? Lean pork or beef   ? Scrambled, poached, or boiled eggs  ? Baked or broiled fish and seafood (not fried!)  ? Beans, edamame and lentils  ? Low fat deli meats: turkey, chicken, ham, roast beef  ? 1% or Skim Milk, Lactaid, or Soymilk  ? Low-fat cheese, cottage cheese, mozzarella string cheese, ricotta  ? Light yogurt, FF/SF frozen yogurt, custard, SF pudding  ? Protein drinks and protein bars with 0-4 grams sugar     Fruits, Vegetables and Snacks   - Green beans, broccoli, cauliflower, spinach, asparagus, carrots, lima beans, yellow squash, zucchini, etc.  - Apple, pineapple, peach, grapes, banana, watermelon, oranges, etc.  - Fruit canned in its own juice or in water (not in syrup)  - Raw veggies  - Lettuce: dark greens like spinach and Densi  - Unsalted Nuts  - Erasmo Links beef jerky     Fluids:   Skim/1% milk, Lactaid, Soymilk  Sugar-free beverages  (decaf and non-carbonated)  Decaf coffee & decaf tea   Water     Healthy Eating on the go ~ Pre and Post-Surgery     (*) Means this meal is appropriate for pre-surgery consumption because it is >30g of protein per meal. Post-surgery, may want to split meal in half or save a small portion for a snack.     [facility] Kitchen  Item  Calories  Protein (g)   Mediterranean Lamb [place]  350 22   *Chicken Kabobs 290 41   *Maddock Kabobs 330 40   Shrimp Kabobs 170 23   *Steak Kabobs 490 42   *Cauliflower Rice Bowl- chicken (salmon, lamb)  490 41   Mediterranean Chicken 260 34   *Protein Power Plate 520 41   Side items: Greek side salad, fruit salad, roasted vegetables, baked falafel       Hobbss   Item  Calories Protein (g)   Artisan Grilled Chicken [place], no bun  <380 36   *Keita Ranch Grilled Chicken Salad, no dressing <320 42   Double Hamburger, no bun <440 25   Side items: apple slices, side salad        Selenas   Item Calories Protein (g)   Grilled Chicken [place], no bun  <370 34   L[place]ayette Chicken Salad, half size, no dressing 320  22   Apple Pecan Chicken Salad, half size, no dressing  340 20   Large Chili 250 21   Side items: garden or caesar side salad (no croutons), apple bites       Jessica Beckford   Item Calories Protein (g)   Grilled Chicken [place], no bun <470 37   Whopper Jr., no bun <310 13   Double Hamburger, no bun  <390 23   *Chicken Garden Salad, no croutons, use ½ packet of dressing  <520 40   Side items: garden side salad,         Chick-jacinta-A  Item Calories Protein (g)   Grilled Chicken [place], no bun  <310 29   Grilled Nuggets, 8 count 140 25   Grilled Chicken Market Salad with light balsamic dressing 330 28   Small Chicken Tortilla Soup, no tortilla strips 340 23   Side items: side salad, superfood side salad, carrot raisin salad, fruit cup,          Sonic  Item Calories Protein (g)   Jr. Lopez, no bun  <330 15   Classic Grilled Chicken Spring, no bun <490 31   Hearty [place]in[place], no fritos 140 10       Tonys   Item Calories Protein (g)   Blackened Chicken Tenders, 3 piece 170 26   Side items: green beans             Isaac Ayala   Item Calories Protein (g)   Unwich: any sandwich made wrapped in lettuce instead of bread. Ask for no singh.      Original Roast Beef Unwich 260 16   Skidmore Breast Unwich 230 14   Perfect Macanese Unwich 340 22   Ham and Provolone Unwich 350 19   Tuna Salad Unwich 280 11   The Veggie Unwich 410 17   Side items: dill pickle          Chipotle  Item Calories Protein (g)   *Salad with your choice of meat, beans, fresh tomato salsa, fajita veggies, and guacamole (NO rice) ~375 ~40        Applebees  Item Calories Protein (g)   Grilled Chicken Breast 290 38   Burt Shrimp Salad, no wonton strips, use ½ dressing <390 26   Blackened Shrimp Caesar Salad, no croutons, use ½ dressing  <660 25   *Grilled Chicken Caesar Salad, no croutons, use ½ dressing <770 47   Dearborn Cicero  with Maple Mustard Glaze 350 37   Side Grilled Shrimp Skewer 110 27   Side items: steamed broccoli, garlicky green beans,               [employer]OP  Item Calories Protein (g)   *Spinach & Mushroom Omelette, no hollandaise sauce  <890 46   *Garden Omelette 840 46   Egg White Vegetable Omelette 380 29   *Grilled Chicken & Veggie Salad, use ½ dressing  <680 38     Olive Garden   Item Calories Protein (g)   *Herb-Grilled [place] 460 45   House salad with, no croutons. (Add grilled chicken or shrimp) <400 25   Side items: steamed broccoli       Walk Ons   Tuna Tini 410 30   Venison [place]- Bowl 330 14   Chicken Berry Pecan Salad      Side items: seasonal fruit, broccoli, green beans side salad       1200- 1500 calorie Sample meal plan   80-120g protein per day.   Protein drinks and bars: 0-4 grams sugar   Drink lots of water throughout the day and exercise!   MENU # 1   Breakfast: 2 eggs, 1 turkey sausage rolando, 1 apple   Snack: P3 protein pack  Lunch: 2 roll-ups (sliced turkey, low-fat sliced cheese, mustard), 12 baby carrots dipped in 1 Tbsp natural peanut butter   Mid-Day Snack: ¼ cup unsalted almonds, ½ cup fruit   Dinner: 1 chicken thigh simmered in tomato sauce + 2 Tbsp mozzarella cheese, ½ cup black beans, 1/2 cup steamed carrots   Evening Snack: 1/2 cup grapes with 4 cubes low-fat cheddar cheese   ___________________________________________________   MENU # 2   Breakfast: 200 calorie protein drink   Mid-morning snack : ¼ cup unsalted nuts, medium banana   Lunch: 3oz tuna or chicken salad made with 2 tbsp light singh, over salad with tomatoes and cucumbers.   Snack: low-fat cheese stick   Dinner: 3oz hamburger rolando, slice of low-fat cheese, 1 cup yellow squash and zucchini sauteed in nonstick cookspray  Snack: 6oz light yogurt   _______________________________________________________   Menu #3   Breakfast: 6oz plain Greek yogurt + fruit (½ banana OR ½ cup fruit - pineapple, blueberries, strawberries, peach), may add  Splenda to sara.   Lunch: Grilled chicken breast w/ slice low-fat pepper shauna cheese, 1/2 cup grilled/baked asparagus and small salad with Salad Spritzer.   Mid-Day snack: 200 calorie protein drink   Dinner: 4oz Grilled fish, ½ cup white beans, ½ cup cooked spinach   Evening Snack: fudgsicle no-sugar-added   Menu # 4   Breakfast: 1 scoop vanilla protein powder + 4oz skim milk + 4oz coffee   Snack: Pure protein bar   Lunch: 2 Lettuce tacos: 3oz seasoned ground turkey wrapped in a Denis lettuce leaves with 1 Tbsp shredded cheese and dollop low-fat sour cream   Snack: ½ cup cottage cheese, ½ cup pineapple chunks   Dinner: Shrimp omelet: 2 eggs, ½ cup shrimp, green onions, and shredded cheese   ______________________________________________________   Menu #5   Breakfast: 1 cup low-fat cottage cheese, ½ cup peaches (no sugar added)   Snack: 1 apple, 4 cubes cheese   Lunch: 3oz baked pork chop, 1 cup okra and tomato stew   Snack: 1/2 cup black beans + salsa + dollop light sour cream   Dinner: Caprese chicken salad: 3 oz chicken breast, 1oz fresh mozzarella, sliced tomato, 1 Tbsp olive oil, basil   Snack: sugar-free popsicle   _______________________________________________________  Menu #6   Breakfast: ½ cup part-skim ricotta cheese, 2 Tbsp sugar-free strawberry preserves, sprinkle of slivered almonds   Snack: 1 orange + string cheese  Lunch: 3 oz canned chicken, 1oz shredded cheddar cheese, ½ cup black beans, 2 Tbsp salsa   Snack: 200 calorie Protein drink   Dinner: 4 oz grilled salmon steak, over mixed green salad with 2 tbsp light dressing   _______________________________________________________  Menu #7  Breakfast: crust-less quiche (bake 1 egg mixed w/ low fat cheese, broccoli and low sodium ham in a muffin cup until cooked inside)  Snack: 1 light yogurt  Lunch: protein shake (1 scoop powder + 8 oz skim milk, blended w/ ice)  Snack: small apple w/ 2 tbsp PB2 (powdered peanut butter)  Dinner: 2 Turkey meatballs  w/ low sugar marinara sauce, 1/2 cup spiralized zucchini (sauteed on stove top w/ nonstick cookspray)

## 2020-08-26 NOTE — ASSESSMENT & PLAN NOTE
Diagnosed in 2017     Symptoms    Has problems with severe head ache for about 3 years    Vision problems - found on exam - but subjectively does not have vision problems     Tried Diamox - stopped due to kidney stones    Currently taking Lasix, Spironolactone with no help     Weight loss of 8 pounds did not help    High volume Lumbar puncture helps for a few days     Was offered Shunt versus bariatric surgery to help with Pseudotumor cerebri     She choose bariatric surgery     As per neurologist - can proceed with surgery    - Had Cerebral angiogram April 2018

## 2020-08-26 NOTE — ASSESSMENT & PLAN NOTE
Weight related conditions     - HTN  - Sleep apnea  - Acid reflux   - Pseudo tumor cerebri    Not known to have Type 2 DM, osteo arthritis,fatty liver     Encouraged weight loss

## 2020-08-27 LAB — H PYLORI IGG SERPL QL IA: NEGATIVE

## 2020-09-08 ENCOUNTER — CLINICAL SUPPORT (OUTPATIENT)
Dept: BARIATRICS | Facility: CLINIC | Age: 45
End: 2020-09-08
Payer: COMMERCIAL

## 2020-09-08 VITALS — BODY MASS INDEX: 36.42 KG/M2 | WEIGHT: 203 LBS

## 2020-09-08 PROCEDURE — 99499 NO LOS: ICD-10-PCS | Mod: 95,,, | Performed by: DIETITIAN, REGISTERED

## 2020-09-08 PROCEDURE — 99499 UNLISTED E&M SERVICE: CPT | Mod: 95,,, | Performed by: DIETITIAN, REGISTERED

## 2020-09-08 NOTE — PROGRESS NOTES
The patient location is:  Patient Home   The chief complaint leading to consultation is: severe obesity with comorbidity in work up for bariatric surgery  Visit type: Virtual visit with synchronous audio and video  Total time spent with patient: 20 minutes face to face 10 minutes documentation  Each patient to whom he or she provides medical services by telemedicine is:  (1) informed of the relationship between the physician and patient and the respective role of any other health care provider with respect to management of the patient; and (2) notified that he or she may decline to receive medical services by telemedicine and may withdraw from such care at any time.  Nutrition Handout located in AVS section of pt's [facility] nelli and/or sent as a message via [facility] portal.  Pt informed of handout and how to access this information in [facility] [employer] [facility] nelli.  NUTRITION NOTE    Referring Physician: Hay Cheung M.D.  Reason for MNT Referral: Medically supervised diet pending Gastric Sleeve    Patient presents for 2nd visit for MSD with weight loss over the past month; total weight loss by making numerous dietary and lifestyle changes. Gave up diet adela    CLINICAL DATA:  45 y.o. female.    Current Weight: 203 lbs  Weight Change Since Initial Visit: Loss of 8 lbs  Ideal Body Weight: 135 lbs  Body mass index is 36.42 kg/m².   Initial Weight: 211 lbs       DAILY NUTRITIONAL NEEDS: pre op bariatric nutritional guidelines to promote weight loss  [id number] Calories    Grams Protein       CURRENT DIET:  Reduced-calorie diet.  Diet Recall: Food records are not present.     Current diet recall:   B ID Life with 1 % milk    L subway sandwich but does not eat bread or cookies  D baked chicken and salad  Snack: banana on occ     Diet Includes: 2 high protein meals with 1-2 protein shakes and 1 healthy snack  Meal Pattern: Improved.  Protein Supplements: 1-2 per day.    IDLife-26 mgs protein powder with 4 gms of sugar mixed  with 1 % milk, premier protein banana and cream or protein 2.0  Snacking: Adequate. Snacks include healthy choices.    Meal pattern:   Protein supplements:  IDLife-26 mgs protein powder with 4 gms of sugar, premier protein banana and cream  Snackin-1 / day  Vegetables: Likes a variety. Eats daily  Fruits: Likes a variety. Eats 2-3 times per week.  Beverages: water and protein 2.0,   Dining out: once a week  Mostly grilled shrimp and salad  Cooking at home: Daily Mostly baked, grilled, smothered  meat, salad  and bread only one time per week.     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  Alcohol: None.  Smoking: None.    ASSESSMENT:  Patient demonstrates all willingness to change lifestyle habits as evidenced by weight loss, good exercise, dietary changes, including protein drinks, increased fruits, increased vegetables, reduced dining out, better choices when dining out, more food preparation at addy, healthier cooking at home, bringing snacks to work, healthier snacking at work and healthier snacking at home.    Doing well with working on greatest challenges (starchy CHO).    Barriers to Education:  none  Stage of Change:  action    NUTRITION DIAGNOSIS:  Morbid Obesity related to Physical inactivity as evidence by BMI.  Status: Improved    PLAN:  Pt is good candidate for surgery.    Diet: Maintain diet plan.  Start shopping for bariatric vitamins & minerals.    Exercise: Increase.    Behavior Modification: Begin to document food & activity logs daily.    .    Communicated nutrition plan with  bariatric team.    SESSION TIME:  30 minutes

## 2020-09-09 ENCOUNTER — TELEPHONE (OUTPATIENT)
Dept: BARIATRICS | Facility: CLINIC | Age: 45
End: 2020-09-09

## 2020-09-09 DIAGNOSIS — E78.5 HYPERLIPIDEMIA, UNSPECIFIED HYPERLIPIDEMIA TYPE: ICD-10-CM

## 2020-09-09 DIAGNOSIS — K21.9 GASTROESOPHAGEAL REFLUX DISEASE, ESOPHAGITIS PRESENCE NOT SPECIFIED: ICD-10-CM

## 2020-09-09 DIAGNOSIS — E23.6 EMPTY SELLA: ICD-10-CM

## 2020-09-09 DIAGNOSIS — Z01.818 PRE-OP EVALUATION: ICD-10-CM

## 2020-09-09 DIAGNOSIS — I10 ESSENTIAL HYPERTENSION: ICD-10-CM

## 2020-09-09 DIAGNOSIS — G47.33 OSA (OBSTRUCTIVE SLEEP APNEA): ICD-10-CM

## 2020-09-09 DIAGNOSIS — E66.9 OBESITY, UNSPECIFIED CLASSIFICATION, UNSPECIFIED OBESITY TYPE, UNSPECIFIED WHETHER SERIOUS COMORBIDITY PRESENT: ICD-10-CM

## 2020-09-09 DIAGNOSIS — G93.2 PSEUDOTUMOR CEREBRI: ICD-10-CM

## 2020-09-10 ENCOUNTER — TELEPHONE (OUTPATIENT)
Dept: BARIATRICS | Facility: CLINIC | Age: 45
End: 2020-09-10

## 2020-09-10 NOTE — TELEPHONE ENCOUNTER
Patient returned call. Spoke with patient and confirmed the surgical procedure of sleeve with Dr. Cheung.  Scheduled preop appts/ surgery date/ post op appts. All dates and times agreed upon. All medications have been reviewed regarding the necessity to be crushed or broken into pieces smaller that the tip of a pencil eraser for the required period of time following surgical procedures.  Pt aware that protein liquid diet start date is 10/8/2020.  Screened patient for history of UTI per protocol- UA collected at evaluation visit.   Discussed with patient to avoid antibiotics and elective procedures involving sedation/anesthesia within 30 days of surgery.  Patient instructed to call the bariatric clinic post op for any s/s of UTI.  Patient's mailing address confirmed with patient.  Pt aware that all appts can be seen in my [facility] patient portal at this time and appt reminders mailed to patient's mailing address today by RN.  Office phone and fax number given to patient for any future questions/concerns. Patient states her caregiver will be her daughter, Katelyn Silva. Patient notified per HDP and avni Gallegos for patient to go home after surgery as opposed to staying at hotel in the area.

## 2020-09-30 ENCOUNTER — PATIENT MESSAGE (OUTPATIENT)
Dept: BARIATRICS | Facility: CLINIC | Age: 45
End: 2020-09-30

## 2020-10-01 ENCOUNTER — PATIENT MESSAGE (OUTPATIENT)
Dept: BARIATRICS | Facility: CLINIC | Age: 45
End: 2020-10-01

## 2020-10-01 ENCOUNTER — TELEPHONE (OUTPATIENT)
Dept: BARIATRICS | Facility: CLINIC | Age: 45
End: 2020-10-01

## 2020-10-01 NOTE — TELEPHONE ENCOUNTER
Returned patient's call. Went over pre op booklet with patient and reviewed pre op appointment times.

## 2020-10-06 ENCOUNTER — DOCUMENTATION ONLY (OUTPATIENT)
Dept: INTERNAL MEDICINE | Facility: CLINIC | Age: 45
End: 2020-10-06

## 2020-10-06 NOTE — PROGRESS NOTES
Sleep study showed sleep apnea  Suggested support staff to  check , if the patient started CPAP   If not started yet,  suggest that she follows up regarding this   It is preferable  , if she can start CPAP by the time she comes for surgery next week

## 2020-10-07 ENCOUNTER — PATIENT MESSAGE (OUTPATIENT)
Dept: BARIATRICS | Facility: CLINIC | Age: 45
End: 2020-10-07

## 2020-10-07 ENCOUNTER — TELEPHONE (OUTPATIENT)
Dept: BARIATRICS | Facility: CLINIC | Age: 45
End: 2020-10-07

## 2020-10-08 ENCOUNTER — TELEPHONE (OUTPATIENT)
Dept: BARIATRICS | Facility: CLINIC | Age: 45
End: 2020-10-08

## 2020-10-08 NOTE — TELEPHONE ENCOUNTER
Called patient to discuss liquid diet and vitamins.    Pt using Premier protein and Adlife    Discussion:  -  gms of protein per day  - 600-800 calories per day   - Less than 4gm sugar per shake  - SF, Decaf, non-carbonated Fluids  - No Fruits, juices, yogurt or pudding on liquid diet  - No vitamins, fish oils or herbal supplements for 1 week prior to surgery    Remind pt per nursing and medical team to inform our department if taking antibiotics within the 30 days post bariatric surgery or it any other surgeries/procedures are scheduled within 30 days after bariatric surgery.    Reminded pt of pre-op and surgery dates.    Pt to call back with any questions.    ----- Message from Diana Ashley RN sent at 9/10/2020  8:41 AM CDT -----  Regarding: JEFF PATIENT LIQUID DIET  1 week liquid diet starts 10/8/2020  sleeve surgery scheduled 10/15/2020  preop appt scheduled 10/14/2020  Please place [facility] vitamin order. (? Are we doing this with [facility] patients?)

## 2020-10-08 NOTE — TELEPHONE ENCOUNTER
Received a message from Roland Puri that was [place] was inquiring about her disability paperwork as noted below. Called patient and notified her that paperwork was completed and that the it had been faxed to [place].  Informed patient to call if there was any issues or not received.  Patient stated she was trying to get used to the liquid diet that she started today- reinforced the diet requirements and rationale.  Patient is ready for surgery and is aware of all appts and surgery date.     Message regarding patient follow up denoted above:    Hi:) Could you please let the patient know the info in attached email. She was asking about it yesterday.   Thank you,   Roland  Sent from my [employer]    Begin forwarded message:  From: Kailyn <[email or web address]>  Date: October 8, 2020 at 10:09:58 AM EDT  To: Roland Puri <[email or web address]>  Subject: MRN [id number]  ?   Good Morning,     This form was already completed and faxed to [facility] on 09/16/2020. I have attached a copy of this form to this email. Let us know if you need any further assistance.     Thank you  Nelsy MCCAULEY  Disability Desk  [address], [place]   [phone]           From: [email or web address] <[email or web address]>   Sent: Wednesday, October 07, 2020 5:44 PM  To: Kailyn <[email or web address]>; Roland Puri <[email or web address]>  Subject: Ricoh Sunita Scan to Me

## 2020-10-09 ENCOUNTER — PATIENT MESSAGE (OUTPATIENT)
Dept: BARIATRICS | Facility: CLINIC | Age: 45
End: 2020-10-09

## 2020-10-12 ENCOUNTER — TELEPHONE (OUTPATIENT)
Dept: ADMINISTRATIVE | Facility: OTHER | Age: 45
End: 2020-10-12

## 2020-10-12 ENCOUNTER — PATIENT MESSAGE (OUTPATIENT)
Dept: BARIATRICS | Facility: CLINIC | Age: 45
End: 2020-10-12

## 2020-10-13 DIAGNOSIS — E66.01 CLASS 2 SEVERE OBESITY DUE TO EXCESS CALORIES WITH SERIOUS COMORBIDITY AND BODY MASS INDEX (BMI) OF 37.0 TO 37.9 IN ADULT: Primary | ICD-10-CM

## 2020-10-13 RX ORDER — POLYETHYLENE GLYCOL 3350 17 G/17G
17 POWDER, FOR SOLUTION ORAL DAILY
Qty: 1 BOTTLE | Refills: 3 | Status: SHIPPED | OUTPATIENT
Start: 2020-10-13

## 2020-10-13 RX ORDER — PROMETHAZINE HYDROCHLORIDE 25 MG/1
SUPPOSITORY RECTAL
Qty: 15 SUPPOSITORY | Refills: 0 | Status: SHIPPED | OUTPATIENT
Start: 2020-10-13

## 2020-10-13 RX ORDER — HYDROCODONE BITARTRATE AND ACETAMINOPHEN 7.5; 325 MG/15ML; MG/15ML
15 SOLUTION ORAL 4 TIMES DAILY PRN
Qty: 118 ML | Refills: 0 | Status: SHIPPED | OUTPATIENT
Start: 2020-10-13

## 2020-10-13 RX ORDER — OMEPRAZOLE 40 MG/1
40 CAPSULE, DELAYED RELEASE ORAL EVERY MORNING
Qty: 30 CAPSULE | Refills: 2 | Status: SHIPPED | OUTPATIENT
Start: 2020-10-13 | End: 2021-03-09

## 2020-10-13 RX ORDER — ONDANSETRON 8 MG/1
8 TABLET, ORALLY DISINTEGRATING ORAL EVERY 6 HOURS PRN
Qty: 30 TABLET | Refills: 0 | Status: SHIPPED | OUTPATIENT
Start: 2020-10-13 | End: 2020-11-13

## 2020-10-13 RX ORDER — URSODIOL 500 MG/1
TABLET, FILM COATED ORAL
Qty: 90 TABLET | Refills: 1 | Status: SHIPPED | OUTPATIENT
Start: 2020-10-13

## 2020-10-14 ENCOUNTER — ANESTHESIA EVENT (OUTPATIENT)
Dept: SURGERY | Facility: HOSPITAL | Age: 45
DRG: 621 | End: 2020-10-14
Payer: COMMERCIAL

## 2020-10-14 ENCOUNTER — LAB VISIT (OUTPATIENT)
Dept: SURGERY | Facility: CLINIC | Age: 45
End: 2020-10-14
Payer: COMMERCIAL

## 2020-10-14 ENCOUNTER — TELEPHONE (OUTPATIENT)
Dept: BARIATRICS | Facility: CLINIC | Age: 45
End: 2020-10-14

## 2020-10-14 ENCOUNTER — CLINICAL SUPPORT (OUTPATIENT)
Dept: BARIATRICS | Facility: CLINIC | Age: 45
End: 2020-10-14
Payer: COMMERCIAL

## 2020-10-14 ENCOUNTER — OFFICE VISIT (OUTPATIENT)
Dept: BARIATRICS | Facility: CLINIC | Age: 45
End: 2020-10-14
Payer: COMMERCIAL

## 2020-10-14 VITALS
HEIGHT: 63 IN | SYSTOLIC BLOOD PRESSURE: 126 MMHG | WEIGHT: 197.31 LBS | HEART RATE: 80 BPM | RESPIRATION RATE: 18 BRPM | BODY MASS INDEX: 34.96 KG/M2 | DIASTOLIC BLOOD PRESSURE: 68 MMHG

## 2020-10-14 DIAGNOSIS — E66.01 MORBID OBESITY: Primary | ICD-10-CM

## 2020-10-14 DIAGNOSIS — E78.5 HYPERLIPIDEMIA, UNSPECIFIED HYPERLIPIDEMIA TYPE: ICD-10-CM

## 2020-10-14 DIAGNOSIS — Z01.818 PRE-OP EVALUATION: ICD-10-CM

## 2020-10-14 DIAGNOSIS — I10 ESSENTIAL HYPERTENSION: ICD-10-CM

## 2020-10-14 PROCEDURE — 99999 PR PBB SHADOW E&M-EST. PATIENT-LVL III: CPT | Mod: PBBFAC,COE,, | Performed by: SURGERY

## 2020-10-14 PROCEDURE — 99999 PR PBB SHADOW E&M-EST. PATIENT-LVL III: ICD-10-PCS | Mod: PBBFAC,COE,, | Performed by: SURGERY

## 2020-10-14 PROCEDURE — 3008F BODY MASS INDEX DOCD: CPT | Mod: CPTII,S$GLB,COE, | Performed by: SURGERY

## 2020-10-14 PROCEDURE — 99213 OFFICE O/P EST LOW 20 MIN: CPT | Mod: S$GLB,COE,, | Performed by: SURGERY

## 2020-10-14 PROCEDURE — 99499 UNLISTED E&M SERVICE: CPT | Mod: S$GLB,COE,, | Performed by: DIETITIAN, REGISTERED

## 2020-10-14 PROCEDURE — U0003 INFECTIOUS AGENT DETECTION BY NUCLEIC ACID (DNA OR RNA); SEVERE ACUTE RESPIRATORY SYNDROME CORONAVIRUS 2 (SARS-COV-2) (CORONAVIRUS DISEASE [COVID-19]), AMPLIFIED PROBE TECHNIQUE, MAKING USE OF HIGH THROUGHPUT TECHNOLOGIES AS DESCRIBED BY CMS-2020-01-R: HCPCS

## 2020-10-14 PROCEDURE — 99499 NO LOS: ICD-10-PCS | Mod: S$GLB,COE,, | Performed by: DIETITIAN, REGISTERED

## 2020-10-14 PROCEDURE — 3008F PR BODY MASS INDEX (BMI) DOCUMENTED: ICD-10-PCS | Mod: CPTII,S$GLB,COE, | Performed by: SURGERY

## 2020-10-14 PROCEDURE — 99213 PR OFFICE/OUTPT VISIT, EST, LEVL III, 20-29 MIN: ICD-10-PCS | Mod: S$GLB,COE,, | Performed by: SURGERY

## 2020-10-14 RX ORDER — SODIUM CITRATE AND CITRIC ACID MONOHYDRATE 334; 500 MG/5ML; MG/5ML
30 SOLUTION ORAL ONCE
Status: CANCELLED | OUTPATIENT
Start: 2020-10-14 | End: 2020-10-14

## 2020-10-14 RX ORDER — HYDROCODONE BITARTRATE AND ACETAMINOPHEN 7.5; 325 MG/15ML; MG/15ML
15 SOLUTION ORAL EVERY 4 HOURS PRN
Status: CANCELLED | OUTPATIENT
Start: 2020-10-15

## 2020-10-14 RX ORDER — LIDOCAINE HYDROCHLORIDE 10 MG/ML
1 INJECTION, SOLUTION EPIDURAL; INFILTRATION; INTRACAUDAL; PERINEURAL ONCE
Status: CANCELLED | OUTPATIENT
Start: 2020-10-14 | End: 2020-10-14

## 2020-10-14 RX ORDER — SODIUM CHLORIDE, SODIUM LACTATE, POTASSIUM CHLORIDE, CALCIUM CHLORIDE 600; 310; 30; 20 MG/100ML; MG/100ML; MG/100ML; MG/100ML
INJECTION, SOLUTION INTRAVENOUS CONTINUOUS
Status: CANCELLED | OUTPATIENT
Start: 2020-10-14

## 2020-10-14 RX ORDER — SODIUM CHLORIDE 9 MG/ML
INJECTION, SOLUTION INTRAVENOUS CONTINUOUS
Status: CANCELLED | OUTPATIENT
Start: 2020-10-14

## 2020-10-14 RX ORDER — FAMOTIDINE 10 MG/ML
20 INJECTION INTRAVENOUS ONCE
Status: CANCELLED | OUTPATIENT
Start: 2020-10-14 | End: 2020-10-14

## 2020-10-14 RX ORDER — DEXTROMETHORPHAN/PSEUDOEPHED 2.5-7.5/.8
40 DROPS ORAL 4 TIMES DAILY PRN
Status: CANCELLED | OUTPATIENT
Start: 2020-10-14

## 2020-10-14 RX ORDER — GABAPENTIN 100 MG/1
300 CAPSULE ORAL 3 TIMES DAILY
Status: CANCELLED | OUTPATIENT
Start: 2020-10-14

## 2020-10-14 RX ORDER — METOCLOPRAMIDE HYDROCHLORIDE 5 MG/ML
10 INJECTION INTRAMUSCULAR; INTRAVENOUS ONCE
Status: CANCELLED | OUTPATIENT
Start: 2020-10-14 | End: 2020-10-14

## 2020-10-14 RX ORDER — HYDROMORPHONE HYDROCHLORIDE 2 MG/ML
0.5 INJECTION, SOLUTION INTRAMUSCULAR; INTRAVENOUS; SUBCUTANEOUS
Status: CANCELLED | OUTPATIENT
Start: 2020-10-14

## 2020-10-14 RX ORDER — ONDANSETRON 2 MG/ML
8 INJECTION INTRAMUSCULAR; INTRAVENOUS EVERY 6 HOURS PRN
Status: CANCELLED | OUTPATIENT
Start: 2020-10-14

## 2020-10-14 RX ORDER — HEPARIN SODIUM 5000 [USP'U]/ML
5000 INJECTION, SOLUTION INTRAVENOUS; SUBCUTANEOUS ONCE
Status: CANCELLED | OUTPATIENT
Start: 2020-10-14 | End: 2020-10-14

## 2020-10-14 RX ORDER — PANTOPRAZOLE SODIUM 40 MG/10ML
40 INJECTION, POWDER, LYOPHILIZED, FOR SOLUTION INTRAVENOUS 2 TIMES DAILY
Status: CANCELLED | OUTPATIENT
Start: 2020-10-14

## 2020-10-14 RX ORDER — ACETAMINOPHEN 650 MG/20.3ML
500 LIQUID ORAL EVERY 8 HOURS
Status: CANCELLED | OUTPATIENT
Start: 2020-10-14 | End: 2020-10-15

## 2020-10-14 RX ORDER — ENOXAPARIN SODIUM 300 MG/3ML
40 INJECTION INTRAVENOUS; SUBCUTANEOUS
Status: CANCELLED | OUTPATIENT
Start: 2020-10-14

## 2020-10-14 RX ORDER — MUPIROCIN 20 MG/G
OINTMENT TOPICAL
Status: CANCELLED | OUTPATIENT
Start: 2020-10-14

## 2020-10-14 NOTE — PROGRESS NOTES
Pt will use premier and Adilife protein shakes.  If using protein powder, reminded pt of mixing with water for days 1 and 2, by day 3 may try using skim, 1% milk or unsweetened almond/soy milk.  By Day 4, may use RTD protein shakes as tolerated  Pt has the following vitamins and minerals to start taking once discharged from hospital  Multivitamin with 18 mg iron take one tablet or chewable twice a day  B-complex with 50 mg thiamin taken once daily  Calcium citrate 500 mg with vitamin D three times per day  Vitamin B-12 500 mcg  Sublingually daily 1200 mcg  Reviewed dosage and timing of vitamin/mineral guidelines.  Reviewed nutritional guidelines for protein and fluid requirements for week 1 and week 2 post-surgery.  Handout provided to log protein and fluid daily.  1 ounce medicine cups provided for patient to measure fluid intake after surgery.  Reviewed common nutritional concerns and prevention tips after bariatric surgery  Pt verbalized understanding of information provided with appropriate questions and comments.

## 2020-10-14 NOTE — TELEPHONE ENCOUNTER
Pt was seen in clinic today for preop visit with Dr. Cheung. Notified patient of arrival time to the [facility] 2[facility] floor [facility] at 0500 with expected surgery start time 0700 on 10/15/2020.   Instructed patient regarding pre-op instructions including no protein shakes or sugar free clear liquids after midnight but can have a rare sip of water for comfort, showering and preop medications to hold/take per anesthesia/preop.  Instructed patient on the s/s of dehydration and for patient to call at the first sign of dehydration.  Informed patient that I or my supervisor from bariatrics will call her daily to review diet/fluid intake and to ensure adequate hydration. Then, we would be checking in at 2 weeks, 2 mo, 6mo, and annually.  Also confirmed she would be following up with her PCP at home.  Reminded patient not to take antibiotics for 30 days following surgery or schedule elective procedures involving anesthesia/sedation for 30 days following surgery unless checking with the bariatric clinic first.  Her daughter was here with her today and will be with her tomorrow and during her post-op stay.  Confirmed with Nerissa Horvath, , on 10/5/2020 all financial obligations had been met by pt. Pt verbalized understanding. Pt given office phone number for any additional questions/concerns.

## 2020-10-14 NOTE — PROGRESS NOTES
Subjective:  The patient is a 45 y.o. obese female who presents for pre op for gastric sleeve surgery.  She has multiple associated comorbidities including PETER on CPAP and pseudotumor cerebri.  Her highest adult weight was 250, and her lowest adult weight was 210lbs.  The patient has tried phentermine (Adipex-P) and Amino Acid diet.  The patient was most successful with Amino Acid with a weight loss of 100lbs.  Her current exercise includes walking. She denies any history of eating disorder such as anorexia, bulimia, or taking laxatives for weight loss, and denies any addiction including illicit substances, alcohol, or gambling.  Patient states she has a good  support system.  She lives with her .  She is currently employed with [employer].  She  denies a history of GERD.    All workup has been reviewed in clinic today and there is nothing on the review that would prevent us from proceeding with surgery.  All questions were answered in clinic today prior to leaving.  Body mass index is 34.95 kg/m².       Patient Active Problem List    Diagnosis Date Noted    Anxiety 08/26/2020    Kidney stones 08/26/2020    Obesity 08/26/2020    H/O vaginal hysterectomy 08/26/2020    History of pneumonia 08/26/2020    Edema 08/26/2020    PETER (obstructive sleep apnea) 08/12/2020    Empty sella 07/30/2020    Essential hypertension 11/14/2019    Pseudotumor cerebri 07/03/2018    Abnormal EKG 07/03/2018    Gastroesophageal reflux disease 03/07/2018    Hyperlipidemia 03/07/2018     Past Medical History:   Diagnosis Date    Abnormal EKG 7/3/2018    Anxiety     Chest pressure 7/3/2018    Depression     GERD (gastroesophageal reflux disease)     History of renal stone     HTN (hypertension)     Hyperlipidemia     Pseudotumor cerebri     Pseudotumor cerebri 7/3/2018      Past Surgical History:   Procedure Laterality Date    APPENDECTOMY      2012    BREAST BIOPSY Left     HYSTERECTOMY      partial      (Not in  "a hospital admission)    Review of patient's allergies indicates:  No Known Allergies   Social History     Tobacco Use    Smoking status: Never Smoker    Smokeless tobacco: Never Used   Substance Use Topics    Alcohol use: No      Family History   Problem Relation Age of Onset    Cancer Mother         lung - was a tobacco smoker     Hypertension Sister     Hypertension Brother     Heart attack Neg Hx     Heart disease Neg Hx     Stroke Neg Hx         Review of Systems  Constitutional: negative for anorexia, chills and fatigue  Eyes: negative for icterus, irritation and redness  Respiratory: negative for cough and dyspnea on exertion  Cardiovascular: negative for chest pain and chest pressure/discomfort  Gastrointestinal: negative for abdominal pain, change in bowel habits, constipation and diarrhea  Musculoskeletal:negative for arthralgias and back pain  Neurological: negative for coordination problems and dizziness  Behavioral/Psych: negative for anxiety and bad mood    Objective:  Vital signs in last 24 hours:  Vitals:    10/14/20 1334   BP: 126/68   Pulse: 80   Resp: 18   Weight: 89.5 kg (197 lb 5 oz)   Height: 5' 3" (1.6 m)               General appearance: alert, appears stated age and cooperative  Head: Normocephalic, without obvious abnormality, atraumatic  Eyes: negative findings: lids and lashes normal and conjunctivae and sclerae normal  Neck: supple, symmetrical, trachea midline and thyroid not enlarged, symmetric, no tenderness/mass/nodules  Lungs: non labored breathing  Heart: regular rate and rhythm  Abdomen: soft, non-tender  Extremities: extremities normal, atraumatic, no cyanosis or edema  Skin: Skin color, texture, turgor normal. No rashes or lesions  Neurologic: Grossly normal      Assessment/Plan:  Morbid obesity with failure of conservative therapy.    The patient was informed that risks include, but are not limited to: death, leak, obstruction, bleeding, and sepsis. Any of these could " require further surgery. Other risks include DVT, PE, pneumonia, wound dehiscence, hernia, wound infection, the need for dilatations and the inability to lose appropriate weight and keep it off.     We discussed that our goal is to ameliorate her medical problems and not to obtain a specific body mass index. She understands the risks and benefits and wishes to proceed with the procedure. She has signed a consent form.       Hay Cheung MD

## 2020-10-15 ENCOUNTER — HOSPITAL ENCOUNTER (INPATIENT)
Facility: HOSPITAL | Age: 45
LOS: 1 days | Discharge: HOME | DRG: 621 | End: 2020-10-16
Attending: SURGERY | Admitting: SURGERY
Payer: COMMERCIAL

## 2020-10-15 ENCOUNTER — ANESTHESIA (OUTPATIENT)
Dept: SURGERY | Facility: HOSPITAL | Age: 45
DRG: 621 | End: 2020-10-15
Payer: COMMERCIAL

## 2020-10-15 DIAGNOSIS — E66.01 MORBID OBESITY: Primary | ICD-10-CM

## 2020-10-15 LAB
SARS-COV-2 RDRP RESP QL NAA+PROBE: NEGATIVE
SARS-COV-2 RNA RESP QL NAA+PROBE: NOT DETECTED

## 2020-10-15 PROCEDURE — 88305 TISSUE EXAM BY PATHOLOGIST: CPT | Performed by: PATHOLOGY

## 2020-10-15 PROCEDURE — 25000003 PHARM REV CODE 250: Performed by: STUDENT IN AN ORGANIZED HEALTH CARE EDUCATION/TRAINING PROGRAM

## 2020-10-15 PROCEDURE — 63600175 PHARM REV CODE 636 W HCPCS: Performed by: SURGERY

## 2020-10-15 PROCEDURE — 27201423 OPTIME MED/SURG SUP & DEVICES STERILE SUPPLY: Performed by: SURGERY

## 2020-10-15 PROCEDURE — 63600175 PHARM REV CODE 636 W HCPCS: Performed by: STUDENT IN AN ORGANIZED HEALTH CARE EDUCATION/TRAINING PROGRAM

## 2020-10-15 PROCEDURE — 25000003 PHARM REV CODE 250: Performed by: SURGERY

## 2020-10-15 PROCEDURE — 43775 LAP SLEEVE GASTRECTOMY: CPT | Mod: COE,,, | Performed by: SURGERY

## 2020-10-15 PROCEDURE — 36000710: Performed by: SURGERY

## 2020-10-15 PROCEDURE — 71000015 HC POSTOP RECOV 1ST HR: Performed by: SURGERY

## 2020-10-15 PROCEDURE — D9220A PRA ANESTHESIA: ICD-10-PCS | Mod: COE,,, | Performed by: ANESTHESIOLOGY

## 2020-10-15 PROCEDURE — 11000001 HC ACUTE MED/SURG PRIVATE ROOM

## 2020-10-15 PROCEDURE — 88307 TISSUE EXAM BY PATHOLOGIST: CPT | Mod: 26,COE,, | Performed by: PATHOLOGY

## 2020-10-15 PROCEDURE — U0002 COVID-19 LAB TEST NON-CDC: HCPCS

## 2020-10-15 PROCEDURE — 37000009 HC ANESTHESIA EA ADD 15 MINS: Performed by: SURGERY

## 2020-10-15 PROCEDURE — 36000711: Performed by: SURGERY

## 2020-10-15 PROCEDURE — C9113 INJ PANTOPRAZOLE SODIUM, VIA: HCPCS | Performed by: SURGERY

## 2020-10-15 PROCEDURE — 88305 TISSUE EXAM BY PATHOLOGIST: ICD-10-PCS | Mod: 26,COE,, | Performed by: PATHOLOGY

## 2020-10-15 PROCEDURE — 88305 TISSUE EXAM BY PATHOLOGIST: CPT | Mod: 26,COE,, | Performed by: PATHOLOGY

## 2020-10-15 PROCEDURE — 37000008 HC ANESTHESIA 1ST 15 MINUTES: Performed by: SURGERY

## 2020-10-15 PROCEDURE — 88307 TISSUE EXAM BY PATHOLOGIST: CPT | Performed by: PATHOLOGY

## 2020-10-15 PROCEDURE — 94799 UNLISTED PULMONARY SVC/PX: CPT

## 2020-10-15 PROCEDURE — 94761 N-INVAS EAR/PLS OXIMETRY MLT: CPT

## 2020-10-15 PROCEDURE — D9220A PRA ANESTHESIA: Mod: COE,,, | Performed by: ANESTHESIOLOGY

## 2020-10-15 PROCEDURE — 71000033 HC RECOVERY, INTIAL HOUR: Performed by: SURGERY

## 2020-10-15 PROCEDURE — 43775 PR LAP, GAST RESTRICT PROC, LONGITUDINAL GASTRECTOMY: ICD-10-PCS | Mod: COE,,, | Performed by: SURGERY

## 2020-10-15 PROCEDURE — 27800903 OPTIME MED/SURG SUP & DEVICES OTHER IMPLANTS: Performed by: SURGERY

## 2020-10-15 PROCEDURE — 71000016 HC POSTOP RECOV ADDL HR: Performed by: SURGERY

## 2020-10-15 PROCEDURE — 88307 PR  SURG PATH,LEVEL V: ICD-10-PCS | Mod: 26,COE,, | Performed by: PATHOLOGY

## 2020-10-15 DEVICE — SEAMGUARD ESCHELON 60 MM.: Type: IMPLANTABLE DEVICE | Site: ABDOMEN | Status: FUNCTIONAL

## 2020-10-15 RX ORDER — DEXTROMETHORPHAN/PSEUDOEPHED 2.5-7.5/.8
40 DROPS ORAL 4 TIMES DAILY PRN
Status: DISCONTINUED | OUTPATIENT
Start: 2020-10-15 | End: 2020-10-16 | Stop reason: HOSPADM

## 2020-10-15 RX ORDER — ONDANSETRON 2 MG/ML
8 INJECTION INTRAMUSCULAR; INTRAVENOUS EVERY 6 HOURS PRN
Status: DISCONTINUED | OUTPATIENT
Start: 2020-10-15 | End: 2020-10-16 | Stop reason: HOSPADM

## 2020-10-15 RX ORDER — HYDROCODONE BITARTRATE AND ACETAMINOPHEN 7.5; 325 MG/15ML; MG/15ML
15 SOLUTION ORAL EVERY 4 HOURS PRN
Status: DISCONTINUED | OUTPATIENT
Start: 2020-10-15 | End: 2020-10-16 | Stop reason: HOSPADM

## 2020-10-15 RX ORDER — FAMOTIDINE 10 MG/ML
20 INJECTION INTRAVENOUS ONCE
Status: COMPLETED | OUTPATIENT
Start: 2020-10-15 | End: 2020-10-15

## 2020-10-15 RX ORDER — SODIUM CITRATE AND CITRIC ACID MONOHYDRATE 334; 500 MG/5ML; MG/5ML
30 SOLUTION ORAL ONCE
Status: COMPLETED | OUTPATIENT
Start: 2020-10-15 | End: 2020-10-15

## 2020-10-15 RX ORDER — CEFAZOLIN SODIUM 1 G/3ML
INJECTION, POWDER, FOR SOLUTION INTRAMUSCULAR; INTRAVENOUS
Status: DISCONTINUED | OUTPATIENT
Start: 2020-10-15 | End: 2020-10-15

## 2020-10-15 RX ORDER — LIDOCAINE HYDROCHLORIDE 10 MG/ML
1 INJECTION, SOLUTION EPIDURAL; INFILTRATION; INTRACAUDAL; PERINEURAL ONCE
Status: COMPLETED | OUTPATIENT
Start: 2020-10-15 | End: 2020-10-15

## 2020-10-15 RX ORDER — LIDOCAINE HYDROCHLORIDE AND EPINEPHRINE 10; 10 MG/ML; UG/ML
INJECTION, SOLUTION INFILTRATION; PERINEURAL
Status: DISCONTINUED | OUTPATIENT
Start: 2020-10-15 | End: 2020-10-15 | Stop reason: HOSPADM

## 2020-10-15 RX ORDER — FENTANYL CITRATE 50 UG/ML
25 INJECTION, SOLUTION INTRAMUSCULAR; INTRAVENOUS EVERY 5 MIN PRN
Status: DISCONTINUED | OUTPATIENT
Start: 2020-10-15 | End: 2020-10-15 | Stop reason: HOSPADM

## 2020-10-15 RX ORDER — FUROSEMIDE 40 MG/1
40 TABLET ORAL DAILY
Status: DISCONTINUED | OUTPATIENT
Start: 2020-10-16 | End: 2020-10-16 | Stop reason: HOSPADM

## 2020-10-15 RX ORDER — ROCURONIUM BROMIDE 10 MG/ML
INJECTION, SOLUTION INTRAVENOUS
Status: DISCONTINUED | OUTPATIENT
Start: 2020-10-15 | End: 2020-10-15

## 2020-10-15 RX ORDER — HEPARIN SODIUM 5000 [USP'U]/ML
5000 INJECTION, SOLUTION INTRAVENOUS; SUBCUTANEOUS ONCE
Status: COMPLETED | OUTPATIENT
Start: 2020-10-15 | End: 2020-10-15

## 2020-10-15 RX ORDER — SODIUM CHLORIDE 0.9 % (FLUSH) 0.9 %
10 SYRINGE (ML) INJECTION
Status: DISCONTINUED | OUTPATIENT
Start: 2020-10-15 | End: 2020-10-16 | Stop reason: HOSPADM

## 2020-10-15 RX ORDER — SODIUM CHLORIDE, SODIUM LACTATE, POTASSIUM CHLORIDE, CALCIUM CHLORIDE 600; 310; 30; 20 MG/100ML; MG/100ML; MG/100ML; MG/100ML
INJECTION, SOLUTION INTRAVENOUS CONTINUOUS
Status: DISCONTINUED | OUTPATIENT
Start: 2020-10-15 | End: 2020-10-16

## 2020-10-15 RX ORDER — ENOXAPARIN SODIUM 100 MG/ML
40 INJECTION SUBCUTANEOUS
Status: DISCONTINUED | OUTPATIENT
Start: 2020-10-15 | End: 2020-10-16 | Stop reason: HOSPADM

## 2020-10-15 RX ORDER — PANTOPRAZOLE SODIUM 40 MG/10ML
40 INJECTION, POWDER, LYOPHILIZED, FOR SOLUTION INTRAVENOUS 2 TIMES DAILY
Status: DISCONTINUED | OUTPATIENT
Start: 2020-10-15 | End: 2020-10-16 | Stop reason: HOSPADM

## 2020-10-15 RX ORDER — FENTANYL CITRATE 50 UG/ML
INJECTION, SOLUTION INTRAMUSCULAR; INTRAVENOUS
Status: DISCONTINUED | OUTPATIENT
Start: 2020-10-15 | End: 2020-10-15

## 2020-10-15 RX ORDER — ENOXAPARIN SODIUM 100 MG/ML
40 INJECTION SUBCUTANEOUS
Status: DISCONTINUED | OUTPATIENT
Start: 2020-10-15 | End: 2020-10-15

## 2020-10-15 RX ORDER — SODIUM CHLORIDE 9 MG/ML
INJECTION, SOLUTION INTRAVENOUS CONTINUOUS
Status: DISCONTINUED | OUTPATIENT
Start: 2020-10-15 | End: 2020-10-15

## 2020-10-15 RX ORDER — BUPIVACAINE HYDROCHLORIDE 2.5 MG/ML
INJECTION, SOLUTION EPIDURAL; INFILTRATION; INTRACAUDAL
Status: DISCONTINUED | OUTPATIENT
Start: 2020-10-15 | End: 2020-10-15 | Stop reason: HOSPADM

## 2020-10-15 RX ORDER — METOCLOPRAMIDE HYDROCHLORIDE 5 MG/ML
10 INJECTION INTRAMUSCULAR; INTRAVENOUS ONCE
Status: COMPLETED | OUTPATIENT
Start: 2020-10-15 | End: 2020-10-15

## 2020-10-15 RX ORDER — HYDROMORPHONE HYDROCHLORIDE 1 MG/ML
0.5 INJECTION, SOLUTION INTRAMUSCULAR; INTRAVENOUS; SUBCUTANEOUS
Status: DISCONTINUED | OUTPATIENT
Start: 2020-10-15 | End: 2020-10-16 | Stop reason: HOSPADM

## 2020-10-15 RX ORDER — CITALOPRAM 10 MG/1
20 TABLET ORAL DAILY
Status: DISCONTINUED | OUTPATIENT
Start: 2020-10-16 | End: 2020-10-15

## 2020-10-15 RX ORDER — ONDANSETRON 2 MG/ML
4 INJECTION INTRAMUSCULAR; INTRAVENOUS DAILY PRN
Status: DISCONTINUED | OUTPATIENT
Start: 2020-10-15 | End: 2020-10-15 | Stop reason: HOSPADM

## 2020-10-15 RX ORDER — MUPIROCIN 20 MG/G
OINTMENT TOPICAL
Status: DISCONTINUED | OUTPATIENT
Start: 2020-10-15 | End: 2020-10-15 | Stop reason: HOSPADM

## 2020-10-15 RX ORDER — GABAPENTIN 300 MG/1
300 CAPSULE ORAL 3 TIMES DAILY
Status: DISCONTINUED | OUTPATIENT
Start: 2020-10-15 | End: 2020-10-15

## 2020-10-15 RX ORDER — CITALOPRAM 10 MG/1
20 TABLET ORAL DAILY
Status: DISCONTINUED | OUTPATIENT
Start: 2020-10-16 | End: 2020-10-16 | Stop reason: HOSPADM

## 2020-10-15 RX ORDER — ATORVASTATIN CALCIUM 20 MG/1
20 TABLET, FILM COATED ORAL DAILY
Status: DISCONTINUED | OUTPATIENT
Start: 2020-10-16 | End: 2020-10-16 | Stop reason: HOSPADM

## 2020-10-15 RX ORDER — PROPOFOL 10 MG/ML
VIAL (ML) INTRAVENOUS
Status: DISCONTINUED | OUTPATIENT
Start: 2020-10-15 | End: 2020-10-15

## 2020-10-15 RX ORDER — PHENYLEPHRINE HYDROCHLORIDE 10 MG/ML
INJECTION INTRAVENOUS
Status: DISCONTINUED | OUTPATIENT
Start: 2020-10-15 | End: 2020-10-15

## 2020-10-15 RX ORDER — GABAPENTIN 250 MG/5ML
250 SOLUTION ORAL EVERY 8 HOURS
Status: DISCONTINUED | OUTPATIENT
Start: 2020-10-15 | End: 2020-10-16 | Stop reason: HOSPADM

## 2020-10-15 RX ORDER — MIDAZOLAM HYDROCHLORIDE 1 MG/ML
INJECTION, SOLUTION INTRAMUSCULAR; INTRAVENOUS
Status: DISCONTINUED | OUTPATIENT
Start: 2020-10-15 | End: 2020-10-15

## 2020-10-15 RX ORDER — LIDOCAINE HCL/PF 100 MG/5ML
SYRINGE (ML) INTRAVENOUS
Status: DISCONTINUED | OUTPATIENT
Start: 2020-10-15 | End: 2020-10-15

## 2020-10-15 RX ORDER — GABAPENTIN 250 MG/5ML
250 SOLUTION ORAL EVERY 8 HOURS
Status: DISCONTINUED | OUTPATIENT
Start: 2020-10-15 | End: 2020-10-15

## 2020-10-15 RX ORDER — ACETAMINOPHEN 650 MG/20.3ML
500 LIQUID ORAL EVERY 8 HOURS
Status: COMPLETED | OUTPATIENT
Start: 2020-10-15 | End: 2020-10-16

## 2020-10-15 RX ADMIN — HYDROCODONE BITARTRATE AND ACETAMINOPHEN 15 ML: 7.5; 325 SOLUTION ORAL at 07:10

## 2020-10-15 RX ADMIN — SODIUM CHLORIDE: 0.9 INJECTION, SOLUTION INTRAVENOUS at 05:10

## 2020-10-15 RX ADMIN — CEFAZOLIN 3 G: 330 INJECTION, POWDER, FOR SOLUTION INTRAMUSCULAR; INTRAVENOUS at 07:10

## 2020-10-15 RX ADMIN — HEPARIN SODIUM 5000 UNITS: 5000 INJECTION INTRAVENOUS; SUBCUTANEOUS at 05:10

## 2020-10-15 RX ADMIN — PHENYLEPHRINE HYDROCHLORIDE 300 MCG: 10 INJECTION INTRAVENOUS at 07:10

## 2020-10-15 RX ADMIN — PROPOFOL 200 MG: 10 INJECTION, EMULSION INTRAVENOUS at 07:10

## 2020-10-15 RX ADMIN — FENTANYL CITRATE 100 MCG: 50 INJECTION, SOLUTION INTRAMUSCULAR; INTRAVENOUS at 07:10

## 2020-10-15 RX ADMIN — ACETAMINOPHEN 499.51 MG: 160 SOLUTION ORAL at 10:10

## 2020-10-15 RX ADMIN — GABAPENTIN 250 MG: 250 SOLUTION ORAL at 10:10

## 2020-10-15 RX ADMIN — GABAPENTIN 250 MG: 250 SOLUTION ORAL at 04:10

## 2020-10-15 RX ADMIN — ONDANSETRON 8 MG: 2 INJECTION INTRAMUSCULAR; INTRAVENOUS at 11:10

## 2020-10-15 RX ADMIN — SIMETHICONE 40 MG: 20 SUSPENSION/ DROPS ORAL at 06:10

## 2020-10-15 RX ADMIN — SUGAMMADEX 400 MG: 100 INJECTION, SOLUTION INTRAVENOUS at 08:10

## 2020-10-15 RX ADMIN — ROCURONIUM BROMIDE 50 MG: 10 INJECTION, SOLUTION INTRAVENOUS at 08:10

## 2020-10-15 RX ADMIN — METOCLOPRAMIDE 10 MG: 5 INJECTION, SOLUTION INTRAMUSCULAR; INTRAVENOUS at 05:10

## 2020-10-15 RX ADMIN — FENTANYL CITRATE 25 MCG: 50 INJECTION INTRAMUSCULAR; INTRAVENOUS at 08:10

## 2020-10-15 RX ADMIN — ENOXAPARIN SODIUM 40 MG: 40 INJECTION SUBCUTANEOUS at 05:10

## 2020-10-15 RX ADMIN — PANTOPRAZOLE SODIUM 40 MG: 40 INJECTION, POWDER, LYOPHILIZED, FOR SOLUTION INTRAVENOUS at 10:10

## 2020-10-15 RX ADMIN — PANTOPRAZOLE SODIUM 40 MG: 40 INJECTION, POWDER, LYOPHILIZED, FOR SOLUTION INTRAVENOUS at 09:10

## 2020-10-15 RX ADMIN — ACETAMINOPHEN 499.51 MG: 160 SOLUTION ORAL at 02:10

## 2020-10-15 RX ADMIN — HYDROCODONE BITARTRATE AND ACETAMINOPHEN 15 ML: 7.5; 325 SOLUTION ORAL at 09:10

## 2020-10-15 RX ADMIN — MIDAZOLAM HYDROCHLORIDE 2 MG: 1 INJECTION, SOLUTION INTRAMUSCULAR; INTRAVENOUS at 07:10

## 2020-10-15 RX ADMIN — ROCURONIUM BROMIDE 50 MG: 10 INJECTION, SOLUTION INTRAVENOUS at 07:10

## 2020-10-15 RX ADMIN — SODIUM CITRATE AND CITRIC ACID MONOHYDRATE 30 ML: 500; 334 SOLUTION ORAL at 05:10

## 2020-10-15 RX ADMIN — HYDROCODONE BITARTRATE AND ACETAMINOPHEN 15 ML: 7.5; 325 SOLUTION ORAL at 12:10

## 2020-10-15 RX ADMIN — LIDOCAINE HYDROCHLORIDE 100 MG: 20 INJECTION, SOLUTION INTRAVENOUS at 07:10

## 2020-10-15 RX ADMIN — MUPIROCIN: 20 OINTMENT TOPICAL at 05:10

## 2020-10-15 RX ADMIN — LIDOCAINE HYDROCHLORIDE 10 MG: 10 INJECTION, SOLUTION EPIDURAL; INFILTRATION; INTRACAUDAL at 05:10

## 2020-10-15 RX ADMIN — FAMOTIDINE 20 MG: 10 INJECTION INTRAVENOUS at 05:10

## 2020-10-15 RX ADMIN — SODIUM CHLORIDE, SODIUM GLUCONATE, SODIUM ACETATE, POTASSIUM CHLORIDE, MAGNESIUM CHLORIDE, SODIUM PHOSPHATE, DIBASIC, AND POTASSIUM PHOSPHATE: .53; .5; .37; .037; .03; .012; .00082 INJECTION, SOLUTION INTRAVENOUS at 07:10

## 2020-10-15 RX ADMIN — SODIUM CHLORIDE: 0.9 INJECTION, SOLUTION INTRAVENOUS at 06:10

## 2020-10-15 RX ADMIN — SODIUM CHLORIDE, SODIUM LACTATE, POTASSIUM CHLORIDE, AND CALCIUM CHLORIDE: .6; .31; .03; .02 INJECTION, SOLUTION INTRAVENOUS at 08:10

## 2020-10-15 NOTE — ANESTHESIA PREPROCEDURE EVALUATION
10/15/2020  Felicity Eisenberg is a 45 y.o., female. With history of anxiety, HTN, GERD, HLD, PETER, presenting for gastric sleeve procedure.    Anesthesia Evaluation    I have reviewed the Patient Summary Reports.      I have reviewed the Medications.     Review of Systems  Cardiovascular:   Hypertension Denies MI.   Denies CABG/stent.     Pulmonary:   Sleep Apnea    Renal/:   renal calculi    Hepatic/GI:   GERD    Neurological:   Denies TIA. Denies CVA.    Psych:   Psychiatric History          Physical Exam  General:  Obesity, Well nourished    Airway/Jaw/Neck:  Airway Findings: Mallampati: III Improves to II with phonation.  Jaw/Neck Findings:  Neck ROM: Normal ROM  Neck Findings:  Girth Increased      Dental:  Dental Findings: Upper partial dentures, Lower partial dentures   Chest/Lungs:  Chest/Lungs Findings: Clear to auscultation     Heart/Vascular:  Heart Findings: Rate: Normal  Rhythm: Regular Rhythm        Mental Status:  Mental Status Findings:  Alert and Oriented         Anesthesia Plan  Type of Anesthesia, risks & benefits discussed:  Anesthesia Type:  general  Patient's Preference:   Intra-op Monitoring Plan: standard ASA monitors  Intra-op Monitoring Plan Comments:   Post Op Pain Control Plan: multimodal analgesia, IV/PO Opioids PRN and per primary service following discharge from PACU  Post Op Pain Control Plan Comments:   Induction:   IV  Beta Blocker:         Informed Consent: Patient understands risks and agrees with Anesthesia plan.  Questions answered. Anesthesia consent signed with patient.  ASA Score: 2     Day of Surgery Review of History & Physical:    H&P update referred to the surgeon.         Ready For Surgery From Anesthesia Perspective.

## 2020-10-15 NOTE — ANESTHESIA PROCEDURE NOTES
Intubation  Performed by: Marina Colon MD  Authorized by: Shawn Kern MD     Intubation:     Induction:  Intravenous    Intubated:  Postinduction    Mask Ventilation:  Easy mask    Attempts:  1    Attempted By:  Resident anesthesiologist    Method of Intubation:  Direct    Blade:  Sherry 3    Laryngeal View Grade: Grade I - full view of chords      Difficult Airway Encountered?: No      Complications:  None    Airway Device:  Oral endotracheal tube    Airway Device Size:  7.0    Style/Cuff Inflation:  Cuffed    Tube secured:  22    Placement Verified By:  Capnometry    Complicating Factors:  None    Findings Post-Intubation:  BS equal bilateral

## 2020-10-15 NOTE — NURSING TRANSFER
Nursing Transfer Note      10/15/2020     Transfer To: room 510     Transfer via bed    Transfer with: IV pump, patient property with belongings    Transported by transporte    Medicines sent: IV fluid    Chart send with patient: Yes    Notified: daughter    Patient reassessed at: 10-15-20 (

## 2020-10-15 NOTE — BRIEF OP NOTE
[facility]  Brief Operative Note    SUMMARY     Surgery Date: 10/15/2020     Surgeon(s) and Role:     * Hay Cheung Jr., MD - Primary     * Gavi Asher MD - Resident - Assisting    Pre-op Diagnosis:  Pseudotumor cerebri [G93.2]  Obesity, unspecified classification, unspecified obesity type, unspecified whether serious comorbidity present [E66.9]  BMI 37.0-37.9, adult [Z68.37]  Essential hypertension [I10]  Hyperlipidemia, unspecified hyperlipidemia type [E78.5]  Gastroesophageal reflux disease, esophagitis presence not specified [K21.9]  Empty sella [E23.6]    Post-op Diagnosis:  Post-Op Diagnosis Codes:     * Pseudotumor cerebri [G93.2]     * Obesity, unspecified classification, unspecified obesity type, unspecified whether serious comorbidity present [E66.9]     * BMI 37.0-37.9, adult [Z68.37]     * Essential hypertension [I10]     * Hyperlipidemia, unspecified hyperlipidemia type [E78.5]     * Gastroesophageal reflux disease, esophagitis presence not specified [K21.9]     * Empty sella [E23.6]    Procedure(s) (LRB):  GASTRECTOMY, SLEEVE, LAPAROSCOPIC with intraop EGD (N/A)    Anesthesia: General    Description of Procedure:   1) laparoscopic sleeve gastrectomy  2) EGD    Description of the findings of the procedure: please see dictated op note    Estimated Blood Loss: 10 cc    Estimated Blood Loss has been documented.         Specimens:   1) greater curve of the stomach    [id number]

## 2020-10-15 NOTE — TRANSFER OF CARE
Anesthesia Transfer of Care Note    Patient: Felicity Eisenberg    Procedure(s) Performed: Procedure(s) (LRB):  GASTRECTOMY, SLEEVE, LAPAROSCOPIC with intraop EGD (N/A)    Patient location: PACU    Anesthesia Type: general    Transport from OR: Transported from OR on 6-10 L/min O2 by face mask with adequate spontaneous ventilation    Post pain: adequate analgesia    Post assessment: no apparent anesthetic complications    Post vital signs: stable    Level of consciousness: awake    Nausea/Vomiting: no nausea/vomiting    Complications: none    Transfer of care protocol was followed      Last vitals:   Visit Vitals  /73 (BP Location: Right arm, Patient Position: Lying)   Pulse 75   Temp 36.7 °C (98 °F) (Oral)   Resp 18   Wt 89.3 kg (196 lb 13.9 oz)   SpO2 96%   Breastfeeding No   BMI 34.87 kg/m²

## 2020-10-15 NOTE — ANESTHESIA POSTPROCEDURE EVALUATION
Anesthesia Post Evaluation    Patient: Felicity Eisenberg    Procedure(s) Performed: Procedure(s) (LRB):  GASTRECTOMY, SLEEVE, LAPAROSCOPIC with intraop EGD (N/A)    Final Anesthesia Type: general    Patient location during evaluation: PACU  Patient participation: Yes- Able to Participate  Level of consciousness: awake and alert  Post-procedure vital signs: reviewed and stable  Pain management: pain needs to be addressed  Airway patency: patent    PONV status at discharge: No PONV  Anesthetic complications: no      Cardiovascular status: blood pressure returned to baseline  Respiratory status: unassisted  Hydration status: euvolemic  Follow-up not needed.          Vitals Value Taken Time   /68 10/15/20 0917   Temp 36.3 °C (97.4 °F) 10/15/20 0836   Pulse 65 10/15/20 0923   Resp 20 10/15/20 0923   SpO2 94 % 10/15/20 0923   Vitals shown include unvalidated device data.      No case tracking events are documented in the log.      Pain/Paty Score: Pain Rating Prior to Med Admin: 7 (10/15/2020  9:05 AM)  Paty Score: 8 (10/15/2020  8:40 AM)

## 2020-10-15 NOTE — PLAN OF CARE
Patient VS remained stable, no fever, pain managed with PRN meds. Denies nausea. Kept on NPO except for liquid meds with little sips of water. IV maintenance fluid on going, 5 lap sites with steri strips, dry. Daughter visited at bedside, updated.

## 2020-10-15 NOTE — INTERVAL H&P NOTE
The patient has been examined and the H&P has been reviewed:    I concur with the findings and no changes have occurred since H&P was written.    Surgery risks, benefits and alternative options discussed and understood by patient/family.          Active Hospital Problems    Diagnosis  POA    Morbid obesity [E66.01]  Yes      Resolved Hospital Problems   No resolved problems to display.

## 2020-10-15 NOTE — PLAN OF CARE
Spoke with Patient and daughter regarding d/c planning assessment. Patient independent with ADL'S, lives with spouse in a mobile home with 16 steps to enter. PCP and Pharmacy verified. No discharge needs noted. Will continue to follow until discharge.   10/15/20 1130   Discharge Assessment   Assessment Type Discharge Planning Assessment   Confirmed/corrected address and phone number on facesheet? Yes   Assessment information obtained from? Patient   Expected Length of Stay (days) 1   Communicated expected length of stay with patient/caregiver yes   Prior to hospitilization cognitive status: Alert/Oriented   Prior to hospitalization functional status: Independent   Current cognitive status: Alert/Oriented   Current Functional Status: Independent   Facility Arrived From: Home   Lives With spouse   Able to Return to Prior Arrangements yes   Is patient able to care for self after discharge? Yes   Who are your caregiver(s) and their phone number(s)? Katelyn Silva (Daughter) [phone]   Patient's perception of discharge disposition home or selfcare   Readmission Within the Last 30 Days no previous admission in last 30 days   Patient currently being followed by outpatient case management? No   Patient currently receives any other outside agency services? No   Equipment Currently Used at Home none   Do you have any problems affording any of your prescribed medications? No   Is the patient taking medications as prescribed? no   Does the patient have transportation home? No   Does the patient receive services at the [facility]? No   Discharge Plan A Home with family   Discharge Plan B Home with family   DME Needed Upon Discharge  none   Patient/Family in Agreement with Plan yes

## 2020-10-15 NOTE — OP NOTE
DATE OF PROCEDURE: 10/15/2020   SERVICE: Bariatric Surgery.   Surgeon(s) and Role:     * Hay Cheung Jr., MD - Primary     * Gavi Asher MD - Resident - Assisting  PREOPERATIVE DIAGNOSES: Morbid obesity with a Body mass index is 34.87 kg/m².   along with benign hypertension, hyperlipidemia and pseudotumor cerebri.   POSTOPERATIVE DIAGNOSES:Same  PROCEDURE: Laparoscopic sleeve gastrectomy and EGD.  ANESTHESIA: General endotracheal and local.   DESCRIPTION OF PROCEDURE: The patient was taken to the Operating Room, placed under general anesthesia, prepped and draped in sterile fashion. At this time,   incision was made approximately 15 cm from xiphoid and 5 cm to the left of   midline after infiltrating with local anesthetic. Using Optiview trocar,   intraabdominal access was obtained under direct visualization without difficulty   and pneumoperitoneum was obtained. Further ports were then placed including   bilateral anterior axillary subcostal 5 mm ports and midclavicular subcostal 5   mm port on the right and a second 12 port approximately 15 cm from xiphoid just   to right of midline. These were all placed after infiltrating with local   anesthetic and under direct visualization. Once the ports were placed, the   patient was placed in steep reverse Trendelenburg. A liver retractor was   placed. The hiatus was then examined. No hernia was noted. Once this was   complete, we turned attention to the sleeve itself. An area on the greater   curve approximately 6 cm proximal from the pylorus was identified and using a   Harmonic scalpel, the gastrocolic ligament was opened, exposing the lesser sac.   We continued to take down attachments along the greater curve including the   short gastrics to the angle of His, freeing the lateral part of the stomach. A   42-[place] bougie was then guided by Anesthesia into the stomach and from the   laparoscopic view guided along the lesser curve. Once the bougie  was in   position along the lesser curve, we began the sleeve with a green load stapler   with SeamGuard at the area 6 cm proximal from the pylorus using the bougie as a   guide on the lesser curve and fired the green load stapler beginning the sleeve.   Further staple loads, blue with SeamGuard were fired along the bougie on the   lesser curve towards the angle of His, completely freeing the lateral part of   the stomach. Once this was complete, the stomach was removed from the incision   approximately 15 cm from xiphoid just to right of midline after it was slightly   incising the skin and stretching the fascia with a Becky. The specimen was   removed and passed off the table with ease. At this time, an 0 Vicryl suture   was then used to close the fascial defect on a suture passer device under direct   visualization. The suture was placed in figure-of-8 fashion, however, it was   not tied down at this time, the patient was laid flat. The bougie was removed.   Attention then turned to an EGD. The scope was placed in the oropharynx,   guided down the esophagus and into the sleeve through the sleeve and into the   antrum with ease. At this time, the area was infiltrated with air and we slowly   pulled the scope back inspecting the sleeve itself. The staple line showed no   signs of bleeding or other abnormalities. The sleeve itself was in good   condition with no abnormalities, no twisting or obstruction and nice uniform   size. After inspection, we suctioned all the air from the antrum and sleeve and   removed the scope under direct visualization, turned our attention back to the   laparoscopic view. We inspected the staple line, no signs of bleeding or other   abnormalities from the staple line. The liver retractor was removed. The ports   were removed under direct visualization. The suture was then placed in the   fascia of the incision 15 cm from xiphoid just to right of midline, was tied   down closing the  fascia of this incision and the incision was irrigated   copiously. At this time, the skin of all five port sites was closed with 4-0   Monocryl suture in subcuticular fashion. Mastisol and Steri-Strips were placed.   The patient was allowed to awake from general anesthesia, transferred to bed   for transfer to Recovery.   COMPLICATIONS: None.   SPONGE COUNT: Correct.   BLOOD LOSS: 15 mL.   FLUIDS: Per Anesthesia.   BLOOD GIVEN: None.   DRAINS: None.   SPECIMENS: Stomach.   CONDITION OF THE PATIENT: Good.   I was present for the entire procedure.

## 2020-10-16 ENCOUNTER — TELEPHONE (OUTPATIENT)
Dept: BARIATRICS | Facility: CLINIC | Age: 45
End: 2020-10-16

## 2020-10-16 VITALS
SYSTOLIC BLOOD PRESSURE: 127 MMHG | RESPIRATION RATE: 10 BRPM | DIASTOLIC BLOOD PRESSURE: 69 MMHG | HEART RATE: 67 BPM | BODY MASS INDEX: 34.87 KG/M2 | OXYGEN SATURATION: 91 % | WEIGHT: 196.88 LBS | TEMPERATURE: 99 F

## 2020-10-16 LAB
ANION GAP SERPL CALC-SCNC: 8 MMOL/L (ref 8–16)
BASOPHILS # BLD AUTO: 0.02 K/UL (ref 0–0.2)
BASOPHILS NFR BLD: 0.2 % (ref 0–1.9)
BUN SERPL-MCNC: 8 MG/DL (ref 6–20)
CALCIUM SERPL-MCNC: 9 MG/DL (ref 8.7–10.5)
CHLORIDE SERPL-SCNC: 104 MMOL/L (ref 95–110)
CO2 SERPL-SCNC: 27 MMOL/L (ref 23–29)
CREAT SERPL-MCNC: 0.8 MG/DL (ref 0.5–1.4)
DIFFERENTIAL METHOD: ABNORMAL
EOSINOPHIL # BLD AUTO: 0 K/UL (ref 0–0.5)
EOSINOPHIL NFR BLD: 0.2 % (ref 0–8)
ERYTHROCYTE [DISTWIDTH] IN BLOOD BY AUTOMATED COUNT: 12.6 % (ref 11.5–14.5)
EST. GFR  (AFRICAN AMERICAN): >60 ML/MIN/1.73 M^2
EST. GFR  (NON AFRICAN AMERICAN): >60 ML/MIN/1.73 M^2
GLUCOSE SERPL-MCNC: 101 MG/DL (ref 70–110)
HCT VFR BLD AUTO: 37.8 % (ref 37–48.5)
HGB BLD-MCNC: 12 G/DL (ref 12–16)
IMM GRANULOCYTES # BLD AUTO: 0.03 K/UL (ref 0–0.04)
IMM GRANULOCYTES NFR BLD AUTO: 0.4 % (ref 0–0.5)
LYMPHOCYTES # BLD AUTO: 1.1 K/UL (ref 1–4.8)
LYMPHOCYTES NFR BLD: 13.5 % (ref 18–48)
MAGNESIUM SERPL-MCNC: 2 MG/DL (ref 1.6–2.6)
MCH RBC QN AUTO: 28.8 PG (ref 27–31)
MCHC RBC AUTO-ENTMCNC: 31.7 G/DL (ref 32–36)
MCV RBC AUTO: 91 FL (ref 82–98)
MONOCYTES # BLD AUTO: 0.6 K/UL (ref 0.3–1)
MONOCYTES NFR BLD: 7.9 % (ref 4–15)
NEUTROPHILS # BLD AUTO: 6.3 K/UL (ref 1.8–7.7)
NEUTROPHILS NFR BLD: 77.8 % (ref 38–73)
NRBC BLD-RTO: 0 /100 WBC
PHOSPHATE SERPL-MCNC: 2.3 MG/DL (ref 2.7–4.5)
PLATELET # BLD AUTO: 247 K/UL (ref 150–350)
PMV BLD AUTO: 10.4 FL (ref 9.2–12.9)
POTASSIUM SERPL-SCNC: 3.8 MMOL/L (ref 3.5–5.1)
RBC # BLD AUTO: 4.17 M/UL (ref 4–5.4)
SODIUM SERPL-SCNC: 139 MMOL/L (ref 136–145)
WBC # BLD AUTO: 8.06 K/UL (ref 3.9–12.7)

## 2020-10-16 PROCEDURE — C9113 INJ PANTOPRAZOLE SODIUM, VIA: HCPCS | Performed by: SURGERY

## 2020-10-16 PROCEDURE — 63600175 PHARM REV CODE 636 W HCPCS: Performed by: SURGERY

## 2020-10-16 PROCEDURE — 80048 BASIC METABOLIC PNL TOTAL CA: CPT

## 2020-10-16 PROCEDURE — 84100 ASSAY OF PHOSPHORUS: CPT

## 2020-10-16 PROCEDURE — 83735 ASSAY OF MAGNESIUM: CPT

## 2020-10-16 PROCEDURE — 25000003 PHARM REV CODE 250: Performed by: STUDENT IN AN ORGANIZED HEALTH CARE EDUCATION/TRAINING PROGRAM

## 2020-10-16 PROCEDURE — 25000003 PHARM REV CODE 250: Performed by: SURGERY

## 2020-10-16 PROCEDURE — 85025 COMPLETE CBC W/AUTO DIFF WBC: CPT

## 2020-10-16 PROCEDURE — 36415 COLL VENOUS BLD VENIPUNCTURE: CPT

## 2020-10-16 RX ADMIN — SODIUM CHLORIDE, SODIUM LACTATE, POTASSIUM CHLORIDE, AND CALCIUM CHLORIDE: .6; .31; .03; .02 INJECTION, SOLUTION INTRAVENOUS at 01:10

## 2020-10-16 RX ADMIN — PANTOPRAZOLE SODIUM 40 MG: 40 INJECTION, POWDER, LYOPHILIZED, FOR SOLUTION INTRAVENOUS at 09:10

## 2020-10-16 RX ADMIN — CITALOPRAM HYDROBROMIDE 20 MG: 10 TABLET ORAL at 09:10

## 2020-10-16 RX ADMIN — HYDROCODONE BITARTRATE AND ACETAMINOPHEN 15 ML: 7.5; 325 SOLUTION ORAL at 09:10

## 2020-10-16 RX ADMIN — ACETAMINOPHEN 499.51 MG: 160 SOLUTION ORAL at 05:10

## 2020-10-16 RX ADMIN — GABAPENTIN 250 MG: 250 SOLUTION ORAL at 05:10

## 2020-10-16 RX ADMIN — ATORVASTATIN CALCIUM 20 MG: 20 TABLET, FILM COATED ORAL at 09:10

## 2020-10-16 RX ADMIN — GABAPENTIN 250 MG: 250 SOLUTION ORAL at 01:10

## 2020-10-16 NOTE — HOSPITAL COURSE
45 y.o. female admitted for planned gastric sleeve. She tolerated the procedure well. She ambulated. Her pain was well controlled. She tolerated clears. She was discharged to home.

## 2020-10-16 NOTE — PROGRESS NOTES
[facility]  General Surgery  Progress Note    Subjective:     Post-Op Info:  Procedure(s) (LRB):  GASTRECTOMY, SLEEVE, LAPAROSCOPIC with intraop EGD (N/A)   1 Day Post-Op     Interval History:   POD1 sleeve.  No acute events.  Pain controlled, no nausea.   Tolerating water protocol.  Ambulating, voiding.     Medications:  Continuous Infusions:   lactated ringers 125 mL/hr at 10/16/20 0140     Scheduled Meds:   atorvastatin  20 mg Oral Daily    citalopram  20 mg Oral Daily    enoxparin  40 mg Subcutaneous Q24H    furosemide  40 mg Oral Daily    gabapentin  250 mg Oral Q8H    pantoprazole  40 mg Intravenous BID     PRN Meds:hydrocodone-apap 7.5-325 MG/15 ML, HYDROmorphone, ondansetron, promethazine (PHENERGAN) IVPB, simethicone, sodium chloride 0.9%     Review of patient's allergies indicates:  No Known Allergies     Objective:     Vital Signs (Most Recent):  Temp: 98.3 °F (36.8 °C) (10/16/20 0503)  Pulse: 61 (10/16/20 0500)  Resp: 20 (10/16/20 0500)  BP: 126/77 (10/16/20 0500)  SpO2: (!) 93 % (10/16/20 0500) Vital Signs (24h Range):  Temp:  [96.6 °F (35.9 °C)-98.3 °F (36.8 °C)] 98.3 °F (36.8 °C)  Pulse:  [61-83] 61  Resp:  [15-22] 20  SpO2:  [93 %-100 %] 93 %  BP: ()/(53-79) 126/77     Weight: 89.3 kg (196 lb 13.9 oz)  Body mass index is 34.87 kg/m².    Intake/Output - Last 3 Shifts       10/14 0700 - 10/15 0659 10/15 0700 - 10/16 0659    P.O.  15    I.V. (mL/kg)  270.8 (3)    Total Intake(mL/kg)  285.8 (3.2)    Net  +285.8              Physical Exam  Vitals signs and nursing note reviewed.   Constitutional:       Appearance: Normal appearance.   Cardiovascular:      Rate and Rhythm: Normal rate.   Pulmonary:      Effort: Pulmonary effort is normal.   Abdominal:      General: There is no distension.      Palpations: Abdomen is soft.      Tenderness: There is no abdominal tenderness.      Comments: Incisions cdi   Neurological:      Mental Status: She is alert.       Significant  Labs:  Pending    Significant Diagnostics:  I have reviewed and interpreted all pertinent imaging results/findings within the past 24 hours.    Assessment/Plan:     * Obesity  45 y.o. female POD#1 s/p lap sleeve gastrectomy    -doing well  -pathway  -SLIV  -CLD  -Hycet prn  -replace lytes prn  -ambulate  -encourage IS  -d/c to home today when carol clears, ambulates, pain controlled          Gavi Martinez MD  General Surgery  [facility]

## 2020-10-16 NOTE — DISCHARGE SUMMARY
[facility]  General Surgery  Discharge Summary      Patient Name: Felicity Eisenberg  MRN: [id number]  Admission Date: 10/15/2020  Hospital Length of Stay: 1 days  Discharge Date and Time:  10/16/2020 1:54 PM  Attending Physician: Hay Cheung Jr.,*   Discharging Provider: Ruben Alexander MD  Primary Care Physician: Julio Hanks MD    HPI:   45 y.o. obese female who presents for pre op for gastric sleeve surgery.  She has multiple associated comorbidities including PETER on CPAP and pseudotumor cerebri.  Her highest adult weight was 250, and her lowest adult weight was 210lbs.  The patient has tried phentermine (Adipex-P) and Amino Acid diet.  The patient was most successful with Amino Acid with a weight loss of 100lbs.  Her current exercise includes walking. She denies any history of eating disorder such as anorexia, bulimia, or taking laxatives for weight loss, and denies any addiction including illicit substances, alcohol, or gambling.  Patient states she has a good  support system.  She lives with her .  She is currently employed with [employer].  She  denies a history of GERD.    All workup has been reviewed in clinic today and there is nothing on the review that would prevent us from proceeding with surgery.  All questions were answered in clinic today prior to leaving.  Body mass index is 34.95 kg/m².      Procedure(s) (LRB):  GASTRECTOMY, SLEEVE, LAPAROSCOPIC with intraop EGD (N/A)     Indwelling Lines/Drains at time of discharge:   Lines/Drains/Airways     None                 Hospital Course (synopsis of major diagnoses, care, treatment, and services provided during the course of the hospital stay):    Patient was admitted to the hospital for procedures as described above, please see operative note for full details. Patient tolerated the procedure well, and was taken to PACU postoperatively for observation during their continued recovery from anesthesia. After an appropriate  duration of observation, patient was deemed stable for transfer to the floor to continue their recovery. Patient continued to recover well postoperatively. She was able to ambulate normally, void volitionally, and tolerate diet. Pain was well controlled with oral medications. Patient was deemed stable for discharge on POD 1 and was discharged with appropriate medications, instructions, and follow up.    Medications and instructions as below.  For more thorough information, please refer to the hospital record and operative report.    Consults:     Significant Diagnostic Studies:     Pending Diagnostic Studies:     Procedure Component Value Units Date/Time    Specimen to Pathology, Surgery General Surgery [[id number]] Collected: 10/15/20 0819    Order Status: Sent Lab Status: In process Updated: 10/15/20 0918          Final Active Diagnoses:    Diagnosis Date Noted POA    PRINCIPAL PROBLEM:  Obesity [E66.9] 08/26/2020 Yes    Morbid obesity [E66.01] 10/15/2020 Yes      Problems Resolved During this Admission:       Discharged Condition: good    Disposition: Home or Self Care    Follow Up:  Follow-up Information     Hay Cheung Jr, MD In 2 weeks.    Specialties: General Surgery, Bariatrics  Contact information:  [address]  [facility] [id number]  [phone]                 Patient Instructions:      Diet Bariatric High Protein Clear Liquid     Lifting restrictions     Notify your health care provider if you experience any of the following:  persistent nausea and vomiting or diarrhea     Notify your health care provider if you experience any of the following:  severe uncontrolled pain     Notify your health care provider if you experience any of the following:  redness, tenderness, or signs of infection (pain, swelling, redness, odor or green/yellow discharge around incision site)     Notify your health care provider if you experience any of the following:  difficulty breathing or increased cough      Notify your health care provider if you experience any of the following:  severe persistent headache     Notify your health care provider if you experience any of the following:  worsening rash     Notify your health care provider if you experience any of the following:  persistent dizziness, light-headedness, or visual disturbances     Notify your health care provider if you experience any of the following:  increased confusion or weakness     Notify your health care provider if you experience any of the following:     Notify your health care provider if you experience any of the following:  temperature >100.4     Lifting restrictions   Order Comments: Please do not lift more than 5 pounds for at least 6 weeks. Reassess at follow up clinic visit.     No driving until:   Order Comments: No driving while taking opioid pain medications.     Notify your health care provider if you experience any of the following:  increased confusion or weakness     Notify your health care provider if you experience any of the following:  persistent dizziness, light-headedness, or visual disturbances     Notify your health care provider if you experience any of the following:  worsening rash     Notify your health care provider if you experience any of the following:  severe persistent headache     Notify your health care provider if you experience any of the following:  difficulty breathing or increased cough     Notify your health care provider if you experience any of the following:  redness, tenderness, or signs of infection (pain, swelling, redness, odor or green/yellow discharge around incision site)     Notify your health care provider if you experience any of the following:  severe uncontrolled pain     Notify your health care provider if you experience any of the following:  persistent nausea and vomiting or diarrhea     Notify your health care provider if you experience any of the following:  temperature >100.4      Activity as tolerated     Shower on day dressing removed (No bath)     Shower on day dressing removed (No bath)   Order Comments: Okay to shower tomorrow. Gentle shower daily with mild soap and water. Please do not soak or submerge in water for at least 6 weeks. Reassess at follow up clinic visit.     Medications:  Reconciled Home Medications:      Medication List      CHANGE how you take these medications    clonazePAM 0.5 MG tablet  Commonly known as: KLONOPIN  Take 0.5 mg by mouth once daily. As needed at night time  For anxiety - once a week  What changed: Another medication with the same name was removed. Continue taking this medication, and follow the directions you see here.        CONTINUE taking these medications    atorvastatin 20 MG tablet  Commonly known as: LIPITOR  Take 20 mg by mouth Daily.     citalopram 20 MG tablet  Commonly known as: CELEXA  Take 20 mg by mouth Daily.     furosemide 20 MG tablet  Commonly known as: LASIX  Take 40 mg by mouth Daily.     hydrocodone-apap 7.5-325 MG/15 ML oral solution  Commonly known as: HYCET  Take 15 mLs by mouth 4 (four) times daily as needed for Pain.     multivitamin per tablet  Commonly known as: THERAGRAN     omeprazole 40 MG capsule  Commonly known as: PRILOSEC  Take 1 capsule (40 mg total) by mouth every morning. Open capsule and take with apple sauce     ondansetron 8 MG Tbdl  Commonly known as: ZOFRAN-ODT  Take 1 tablet (8 mg total) by mouth every 6 (six) hours as needed.     polyethylene glycol 17 gram/dose powder  Commonly known as: GLYCOLAX  Take 17 g by mouth once daily.     promethazine 25 MG suppository  Commonly known as: PHENERGAN  1 rectally every 6 hours prn.  Okay to change to liquid or pills.     spironolactone 50 MG tablet  Commonly known as: ALDACTONE     ursodioL 500 MG tablet  Commonly known as: WILIAN FORTE  Crush one tablet daily for gall bladder. Please compound into liquid and supply for 90 days if insurance approves            Time  spent on the discharge of patient: 15 minutes    Ruben Alexander MD  General Surgery  [facility]

## 2020-10-16 NOTE — TELEPHONE ENCOUNTER
Went to visit pt in room 510 with supervisor, Jenna Maldonado, RN at approximately 9:30a.  Pt was tolerating sips of water, voiding and ambulating without difficulty, and denied any discomfort. Pt reported her daughter was on way and would be present for discharge. Bariatric post op instructions reiterated. Phone numbers of clinic and supervisor cell phone given to pt if needed. Patient verbalized understanding of all of the above.   11:30a, called pt to report Pauline with Dr. Delong called and stated pt could stop Lasix and Aldactone until pt was tolerating at least 64oz fluid daily per Dr. Delong.  Dr. Cheung and pt aware and verbalized understanding.

## 2020-10-16 NOTE — NURSING
Pt stable, pain controlled, pt tolerated clear liquid diet, pt ready to dc, iv removed no irritation, pt aware to be on clears, and to crush meds, pt leaving via wheelchair. Pt already has  Prescriptions.

## 2020-10-16 NOTE — HPI
45 y.o. obese female who presents for pre op for gastric sleeve surgery.  She has multiple associated comorbidities including PETER on CPAP and pseudotumor cerebri.  Her highest adult weight was 250, and her lowest adult weight was 210lbs.  The patient has tried phentermine (Adipex-P) and Amino Acid diet.  The patient was most successful with Amino Acid with a weight loss of 100lbs.  Her current exercise includes walking. She denies any history of eating disorder such as anorexia, bulimia, or taking laxatives for weight loss, and denies any addiction including illicit substances, alcohol, or gambling.  Patient states she has a good  support system.  She lives with her .  She is currently employed with [employer].  She  denies a history of GERD.    All workup has been reviewed in clinic today and there is nothing on the review that would prevent us from proceeding with surgery.  All questions were answered in clinic today prior to leaving.  Body mass index is 34.95 kg/m².

## 2020-10-16 NOTE — SUBJECTIVE & OBJECTIVE
Interval History:   POD1 sleeve.  No acute events.  Pain controlled, no nausea.   Tolerating water protocol.  Ambulating, voiding.     Medications:  Continuous Infusions:   lactated ringers 125 mL/hr at 10/16/20 0140     Scheduled Meds:   atorvastatin  20 mg Oral Daily    citalopram  20 mg Oral Daily    enoxparin  40 mg Subcutaneous Q24H    furosemide  40 mg Oral Daily    gabapentin  250 mg Oral Q8H    pantoprazole  40 mg Intravenous BID     PRN Meds:hydrocodone-apap 7.5-325 MG/15 ML, HYDROmorphone, ondansetron, promethazine (PHENERGAN) IVPB, simethicone, sodium chloride 0.9%     Review of patient's allergies indicates:  No Known Allergies     Objective:     Vital Signs (Most Recent):  Temp: 98.3 °F (36.8 °C) (10/16/20 0503)  Pulse: 61 (10/16/20 0500)  Resp: 20 (10/16/20 0500)  BP: 126/77 (10/16/20 0500)  SpO2: (!) 93 % (10/16/20 0500) Vital Signs (24h Range):  Temp:  [96.6 °F (35.9 °C)-98.3 °F (36.8 °C)] 98.3 °F (36.8 °C)  Pulse:  [61-83] 61  Resp:  [15-22] 20  SpO2:  [93 %-100 %] 93 %  BP: ()/(53-79) 126/77     Weight: 89.3 kg (196 lb 13.9 oz)  Body mass index is 34.87 kg/m².    Intake/Output - Last 3 Shifts       10/14 0700 - 10/15 0659 10/15 0700 - 10/16 0659    P.O.  15    I.V. (mL/kg)  270.8 (3)    Total Intake(mL/kg)  285.8 (3.2)    Net  +285.8              Physical Exam  Vitals signs and nursing note reviewed.   Constitutional:       Appearance: Normal appearance.   Cardiovascular:      Rate and Rhythm: Normal rate.   Pulmonary:      Effort: Pulmonary effort is normal.   Abdominal:      General: There is no distension.      Palpations: Abdomen is soft.      Tenderness: There is no abdominal tenderness.      Comments: Incisions cdi   Neurological:      Mental Status: She is alert.       Significant Labs:  Pending    Significant Diagnostics:  I have reviewed and interpreted all pertinent imaging results/findings within the past 24 hours.

## 2020-10-16 NOTE — PLAN OF CARE
Future Appointments   Date Time Provider Department Center   10/21/2020  1:00 PM Hay Cheung Jr., MD [facility] [address]   10/21/2020  1:30 PM Nolvia Braswell RD [facility] [address]     Patient discharged home to care of self on 10/15/20.   10/16/20 1537   Final Note   Assessment Type Final Discharge Note   Anticipated Discharge Disposition Home   What phone number can be called within the next 1-3 days to see how you are doing after discharge?   ([phone])   Hospital Follow Up  Appt(s) scheduled? Yes   Discharge plans and expectations educations in teach back method with documentation complete? Yes

## 2020-10-16 NOTE — ASSESSMENT & PLAN NOTE
45 y.o. female POD#1 s/p lap sleeve gastrectomy    -doing well  -pathway  -SLIV  -CLD  -Hycet prn  -replace lytes prn  -ambulate  -encourage IS  -d/c to home today when carol clears, ambulates, pain controlled

## 2020-10-19 ENCOUNTER — TELEPHONE (OUTPATIENT)
Dept: BARIATRICS | Facility: CLINIC | Age: 45
End: 2020-10-19

## 2020-10-19 NOTE — TELEPHONE ENCOUNTER
Called patient.  She stated she is doing well- does complain of gas pain 3/10; she also stated she feels hungry.  She is getting 45 gm protein daily and 32 ounces water/power aid zero + 1 jello and 1 popsicle- instructed pt that she can increase protein and clear sugar free items as tolerated.  She is taking all her medications as prescribed.  She is having runny bowel movements once - twice daily; instructed pt to taper Miralax/glycolax to every other day or every 3 days according to her BMs.  Pt verbalized understanding of instructions.  She is aware of appt on Wednesday and will call if she has any questions or concerns.

## 2020-10-20 ENCOUNTER — TELEPHONE (OUTPATIENT)
Dept: BARIATRICS | Facility: CLINIC | Age: 45
End: 2020-10-20

## 2020-10-20 NOTE — TELEPHONE ENCOUNTER
Spoke with pt, she stated her gas pain had improved, her intake remains at 45 grams of protein daily and over 32 oz of liquids including water, power-aid zero, jello, and popsicles. Discussed with pt, she could increase the protein and liquids/sugarfree as tolerated.  She verbalized she is taking her medications and vitamins without difficulty, voiding frequently, and with no complaints. Notified pt, I faxed her PCP, Dr. Julio Hanks the post-op plan and agreement.  Have not heard anything back and LVM with his office this am.  Confirmed appointment with Dr. Cheung tomorrow.  Patient verbalized understanding of the above and instructed to notify our office with any questions or concerns.

## 2020-10-21 ENCOUNTER — OFFICE VISIT (OUTPATIENT)
Dept: BARIATRICS | Facility: CLINIC | Age: 45
End: 2020-10-21
Payer: COMMERCIAL

## 2020-10-21 ENCOUNTER — CLINICAL SUPPORT (OUTPATIENT)
Dept: BARIATRICS | Facility: CLINIC | Age: 45
End: 2020-10-21
Payer: COMMERCIAL

## 2020-10-21 VITALS
HEIGHT: 63 IN | HEART RATE: 85 BPM | DIASTOLIC BLOOD PRESSURE: 59 MMHG | SYSTOLIC BLOOD PRESSURE: 116 MMHG | BODY MASS INDEX: 33.87 KG/M2 | WEIGHT: 191.13 LBS

## 2020-10-21 DIAGNOSIS — Z09 POSTOP CHECK: Primary | ICD-10-CM

## 2020-10-21 LAB
FINAL PATHOLOGIC DIAGNOSIS: NORMAL
Lab: NORMAL

## 2020-10-21 PROCEDURE — 99024 POSTOP FOLLOW-UP VISIT: CPT | Mod: S$GLB,COE,, | Performed by: SURGERY

## 2020-10-21 PROCEDURE — 99999 PR PBB SHADOW E&M-EST. PATIENT-LVL III: CPT | Mod: PBBFAC,COE,, | Performed by: SURGERY

## 2020-10-21 PROCEDURE — 99499 UNLISTED E&M SERVICE: CPT | Mod: S$GLB,COE,, | Performed by: DIETITIAN, REGISTERED

## 2020-10-21 PROCEDURE — 99499 NO LOS: ICD-10-PCS | Mod: S$GLB,COE,, | Performed by: DIETITIAN, REGISTERED

## 2020-10-21 PROCEDURE — 99999 PR PBB SHADOW E&M-EST. PATIENT-LVL III: ICD-10-PCS | Mod: PBBFAC,COE,, | Performed by: SURGERY

## 2020-10-21 PROCEDURE — 99024 PR POST-OP FOLLOW-UP VISIT: ICD-10-PCS | Mod: S$GLB,COE,, | Performed by: SURGERY

## 2020-10-21 NOTE — PROGRESS NOTES
"  BARIATRIC POST-OPERATIVE FOLLOW UP:    HPI:  45 y.o. female presents for post op one week from sleeve.  Doing well.  No complaints today.  Tolerating liquids without issue.  Staying hydrated.  Walking for exercise without difficulty.      Denies: nausea, vomiting, abdominal pain, changes in bowel movement pattern, fever, chills, dysphagia, chest pain, and shortness of breath.    PHYSICAL EXAM:  BP (!) 116/59   Pulse 85   Ht 5' 3" (1.6 m)   Wt 86.7 kg (191 lb 2.2 oz)   BMI 33.86 kg/m²      Weight History Current Weight Total Weight Loss   10/21/2020 191 -191       GENERAL: In NAD, A&O x3  ABDOMEN: Soft, non-tender, non-distended. Well-healing surgical scars are clean, dry, and intact without signs of infection or bleeding.  EXTREMITIES: No clubbing, cyanosis, or edema.      ASSESSMENT:  - Morbid obesity s/p sleeve gastrectomy one week ago.  - Good Weight loss  - Good Exercise regimen  - Good Diet    PLAN:  - Emphasized the importance of regular exercise and adherence to bariatric diet to achieve maximum weight loss.  - Encouraged patient to begin OR continue regular exercise.  - Follow-up with dietician to advance diet.  - Continue daily vitamins and medications.  - RTC as scheduled or sooner if needed.  - Call the office for any issues.  - Check labs today.  "

## 2020-10-21 NOTE — LETTER
October 21, 2020      [facility] 2nd [facility]  [address]  [facility] [address]  Phone: [phone]  Fax: [phone]       Patient: Felicity Eisenberg   YOB: 1975  Date of Visit: 10/21/2020    To Whom It May Concern:    Sammy Eisenberg  was at [facility] on 10/21/2020. She will require access to liquids at all times to take small frequent sips to avoid dehydration. She is currently under restrictions of no lifting, pushing, or pulling over 10lbs until 11/26/2020.  If you have any questions or concerns, or if I can be of further assistance, please do not hesitate to contact me.    Sincerely,      Dr. Hay Cheung M.D.

## 2020-10-21 NOTE — PROGRESS NOTES
NUTRITION NOTE    Referring Physician: Hay Cheung M.D.  Reason for MNT Referral: Follow-up 1 Weeks s/p Gastric Sleeve    PAST MEDICAL HISTORY:  Denies nausea, vomiting, constipation and diarrhea.  Reports doing well.    Past Medical History:   Diagnosis Date    Abnormal EKG 7/3/2018    Anxiety     BMI 37.0-37.9, adult     Chest pressure 7/3/2018    Depression     GERD (gastroesophageal reflux disease)     History of renal stone     HTN (hypertension)     Hyperlipidemia     Obesity     Pseudotumor cerebri     Pseudotumor cerebri 7/3/2018       CLINICAL DATA:  45 y.o. female.    There were no vitals filed for this visit.    Current Weight:191lbs  BMI: 33.86  Total Weight Loss: 20  Lbs from initial appt 08/26/2020  Excess Weight Loss: 32% from initial appt 8/26/2020    LABS:  No recent    CURRENT DIET:  Bariatric Liquid Diet    Diet Recall: 40 grams of protein/day; 44-61 oz of fluids/day    Diet Includes: Protein 2.0, unsweet tea 10 ounces, water with crystal light 2-3 bottles of water  Protein Supplements: Pure Protein mixed with water 25 gms 170 calories one in per day and protein 2.0 one per day    EXERCISE:  See BA    VITAMINS/MINERALS:  See BA    ASSESSMENT:  Doing well overall.  Adequate protein intake.  Adequate fluid intake.    BARIATRIC DIET DISCUSSION:  Instructed and provided written materials on bariatric pureed diet plan.on Oct 29 and soft diet on Nov. 12, 2020  Reinforced post-op nutrition guidelines.    PLAN/RECOMMONDATIONS:  Advance to bariatric pureed diet on Oct 29, 2020  Increase protein intake.  Increase fluid intake.  Continue light exercise.  Continue appropriate vitamins & minerals.  Adjust vitamins & minerals by: take calcium citrate 400 mg three times per day        SESSION TIME: 30 minutes

## 2020-10-22 ENCOUNTER — PATIENT MESSAGE (OUTPATIENT)
Dept: BARIATRICS | Facility: CLINIC | Age: 45
End: 2020-10-22

## 2020-10-23 ENCOUNTER — TELEPHONE (OUTPATIENT)
Dept: BARIATRICS | Facility: CLINIC | Age: 45
End: 2020-10-23

## 2020-10-23 NOTE — TELEPHONE ENCOUNTER
Spoke with pt, explained I would complete her return to work form and e-mail back to her at [email or web address]  I explained we did not hear anything back after calling and faxing the PCP follow up care contract and information.  She verbalized she had an appointment with her PCP, Thursday 10/29 and would be bringing the follow up care with her.  A copy was provided to patient when here on 10/21/2020.  Patient verbalized she was doing good, without any questions or concerns.  Patient instructed to reach out to us with any questions.  Patient verbalized understanding.

## 2020-11-03 ENCOUNTER — TELEPHONE (OUTPATIENT)
Dept: BARIATRICS | Facility: CLINIC | Age: 45
End: 2020-11-03

## 2020-11-03 NOTE — TELEPHONE ENCOUNTER
No answer left msg----- Message from Chyna Puri RN sent at 10/16/2020  3:06 PM CDT -----  Regardin WEEK F/U #JEFF PT#  Ms. Eisenberg had a sleeve with Dr. Cheung on 10/15/2020.  Please add the following regimen to your calendar.     -2 weeks post-op:            Diet review by RD                  Advance to Bariatric Pureed Diet, if tolerated.                   Add/Confirm vitamin regimen:                    - multivitamin with iron twice daily                    -B complex or Super B Complex (depending on thiamine level either 50 mg or 100mg)                    - Vitamin B12 (Sublingual) 500 mcg daily                    -Calcium Citrate 500 mg with Vitamin D (3 times daily)                    - Communicatrion sent to PCP and [facility] team    -4 week post -op:                      -(No visit needed)- Patient to advance diet to Bariatric Soft Diet               -2 months post-op:                Nurse and RD at [facility] via phone call/virtual visit:        -Review of co-morbid conditions        -Diet/Vitamin review by RD        -Advance to Bariatric Soft Diet        -Communication sent to PCP and [facility] team      -6 Months post-op:                                  Nurse and/or RD at [facility] via phone call/virtual visit:        -Review of diet, weight loss, co-morbid conditions        -Communication sent to PCP and [facility] team           -12 Months post-op:     -RD phone call    -Review of diet, weight loss, co-morbid conditions    -Communicatrion sent to PCP and [facility] team       Future Phone calls  yearly after 1 year      o Maintenance of Long Term Follow-up  o Two efforts to contact patient by phone call, then letter to patients   o May cease contacting patient after two consecutive missed follow-up periods

## 2020-11-05 ENCOUNTER — TELEPHONE (OUTPATIENT)
Dept: BARIATRICS | Facility: CLINIC | Age: 45
End: 2020-11-05

## 2020-11-05 NOTE — TELEPHONE ENCOUNTER
Left msg and will send email----- Message from Chyna Puri RN sent at 10/16/2020  3:06 PM CDT -----  Regardin WEEK F/U #JEFF PT#  Ms. Eisenberg had a sleeve with Dr. Cheung on 10/15/2020.  Please add the following regimen to your calendar.     -2 weeks post-op:            Diet review by RD                  Advance to Bariatric Pureed Diet, if tolerated.                   Add/Confirm vitamin regimen:                    - multivitamin with iron twice daily                    -B complex or Super B Complex (depending on thiamine level either 50 mg or 100mg)                    - Vitamin B12 (Sublingual) 500 mcg daily                    -Calcium Citrate 500 mg with Vitamin D (3 times daily)                    - Communicatrion sent to PCP and [facility] team    -4 week post -op:                      -(No visit needed)- Patient to advance diet to Bariatric Soft Diet               -2 months post-op:                Nurse and RD at [facility] via phone call/virtual visit:        -Review of co-morbid conditions        -Diet/Vitamin review by RD        -Advance to Bariatric Soft Diet        -Communication sent to PCP and [facility] team      -6 Months post-op:                                  Nurse and/or RD at [facility] via phone call/virtual visit:        -Review of diet, weight loss, co-morbid conditions        -Communication sent to PCP and [facility] team           -12 Months post-op:     -RD phone call    -Review of diet, weight loss, co-morbid conditions    -Communicatrion sent to PCP and [facility] team       Future Phone calls  yearly after 1 year      o Maintenance of Long Term Follow-up  o Two efforts to contact patient by phone call, then letter to patients   o May cease contacting patient after two consecutive missed follow-up periods

## 2020-11-06 ENCOUNTER — PATIENT MESSAGE (OUTPATIENT)
Dept: BARIATRICS | Facility: CLINIC | Age: 45
End: 2020-11-06

## 2020-11-06 NOTE — TELEPHONE ENCOUNTER
Spoke with pt, she reported going back to work this week, has a sinus cold, and has fullness in her chest at times after drinking. She is tolerating the recommended fluids and protein mostly without difficulty, some fullness for a couple of minutes until those sips pass. Verified she is not using a straw, discussed slow small sips and to thin item of thick consistency. She explained that she has an aversion with water at this time, recommended she try some sugar free flavoring. Verified any sinus medication she may take is Tylenol based and discussed no NSAID use.  Verified she is taking the prescribed Prilosec.  Instructed patient to reach out to us with any questions or concerns.

## 2020-12-10 ENCOUNTER — PATIENT MESSAGE (OUTPATIENT)
Dept: BARIATRICS | Facility: CLINIC | Age: 45
End: 2020-12-10

## 2020-12-10 ENCOUNTER — TELEPHONE (OUTPATIENT)
Dept: BARIATRICS | Facility: CLINIC | Age: 45
End: 2020-12-10

## 2020-12-10 NOTE — TELEPHONE ENCOUNTER
----- Message from Nolvia Braswell RD sent at 2020  1:28 PM CST -----  Regardin month check in phone call    Called, no answer, left msg and sent email via pt's portal

## 2020-12-16 ENCOUNTER — TELEPHONE (OUTPATIENT)
Dept: BARIATRICS | Facility: CLINIC | Age: 45
End: 2020-12-16

## 2020-12-16 NOTE — TELEPHONE ENCOUNTER
Spoke with pt for 2 month follow up call s/p Yady. Patient reported doing and feeling great.  No change in medications, she reported a 40 lb wt loss. Tolerating soft diet without difficulty. Pt did not have 2 mo follow up with PCP scheduled yet, verbalized she would be doing it early next week as she has been very busy at work.  Will note to request records from PCP follow up next week. Patient instructed to call us with any questions or concerns.

## 2020-12-29 ENCOUNTER — TELEPHONE (OUTPATIENT)
Dept: BARIATRICS | Facility: CLINIC | Age: 45
End: 2020-12-29

## 2020-12-29 NOTE — TELEPHONE ENCOUNTER
Called Dr. Hanks office at [phone] to request 2 wk and 2 mo follow up progress notes and labs. Spoke with Romana CARMEN requesting the above direct phone and fax given.

## 2021-01-18 DIAGNOSIS — E66.01 CLASS 2 SEVERE OBESITY DUE TO EXCESS CALORIES WITH SERIOUS COMORBIDITY AND BODY MASS INDEX (BMI) OF 37.0 TO 37.9 IN ADULT: ICD-10-CM

## 2021-01-21 RX ORDER — ONDANSETRON 8 MG/1
TABLET, ORALLY DISINTEGRATING ORAL
Qty: 30 TABLET | Refills: 0 | OUTPATIENT
Start: 2021-01-21

## 2021-01-22 RX ORDER — ONDANSETRON 8 MG/1
TABLET, ORALLY DISINTEGRATING ORAL
Qty: 30 TABLET | Refills: 0 | Status: SHIPPED | OUTPATIENT
Start: 2021-01-22

## 2021-04-16 ENCOUNTER — PATIENT MESSAGE (OUTPATIENT)
Dept: BARIATRICS | Facility: CLINIC | Age: 46
End: 2021-04-16

## 2021-04-26 ENCOUNTER — TELEPHONE (OUTPATIENT)
Dept: BARIATRICS | Facility: CLINIC | Age: 46
End: 2021-04-26

## 2021-07-04 ENCOUNTER — PATIENT MESSAGE (OUTPATIENT)
Dept: BARIATRICS | Facility: CLINIC | Age: 46
End: 2021-07-04

## 2022-04-07 ENCOUNTER — TELEPHONE (OUTPATIENT)
Dept: BARIATRICS | Facility: CLINIC | Age: 47
End: 2022-04-07
Payer: COMMERCIAL

## 2022-04-07 ENCOUNTER — PATIENT MESSAGE (OUTPATIENT)
Dept: BARIATRICS | Facility: CLINIC | Age: 47
End: 2022-04-07
Payer: COMMERCIAL

## 2023-03-01 ENCOUNTER — TELEPHONE (OUTPATIENT)
Dept: BARIATRICS | Facility: CLINIC | Age: 48
End: 2023-03-01
Payer: COMMERCIAL

## 2023-03-01 NOTE — TELEPHONE ENCOUNTER
Phoned Dr Julio Hanks office at [phone] and left message with Griselda that we needed the most recent clinic notes and lab results faxed to us at [phone].  She will get the message back to the staff.

## 2023-03-01 NOTE — TELEPHONE ENCOUNTER
Phoned patient to inquire bout her yearly follow up appointments and lab work.  She said that she sees Dr Julio Hanks every few months and just saw him last month.  Informed her that I would reach out to his office to see if they can fax over the last clinic notes and lab result.  She said she would help secure the documents if she needed to and to let her know.

## (undated) DEVICE — RELOAD ECHELON FLEX GRN 60MM

## (undated) DEVICE — SYR 10CC LUER LOCK

## (undated) DEVICE — DRAPE ABDOMINAL TIBURON 14X11

## (undated) DEVICE — SEE MEDLINE ITEM 156902

## (undated) DEVICE — CLOSURE SKIN STERI STRIP 1/2X4

## (undated) DEVICE — TROCAR ENDOPATH XCEL 12X100MM

## (undated) DEVICE — TUBING HF INSUFFLATION W/ FLTR

## (undated) DEVICE — TROCAR ENDOPATH XCEL 12MM 10CM

## (undated) DEVICE — TROCAR ENDOPATH XCEL 5X100MM

## (undated) DEVICE — SHEARS HARMONIC 5CM 36CM

## (undated) DEVICE — SEE MEDLINE ITEM 152487

## (undated) DEVICE — RELOAD ECHELON FLEX BLU 60MM

## (undated) DEVICE — SUT 0 VICRYL / UR6 (J603)

## (undated) DEVICE — ELECTRODE REM PLYHSV RETURN 9

## (undated) DEVICE — SUT MCRYL PLUS 4-0 PS2 27IN

## (undated) DEVICE — ADHESIVE MASTISOL VIAL 48/BX

## (undated) DEVICE — CANNULA ENDOPATH XCEL 5X100MM

## (undated) DEVICE — STAPLER ECHELON FLEX 60MM 44CM

## (undated) DEVICE — NDL HYPO REG 25G X 1 1/2